# Patient Record
Sex: FEMALE | Race: WHITE | Employment: PART TIME | URBAN - METROPOLITAN AREA
[De-identification: names, ages, dates, MRNs, and addresses within clinical notes are randomized per-mention and may not be internally consistent; named-entity substitution may affect disease eponyms.]

---

## 2019-05-08 ENCOUNTER — HOSPITAL ENCOUNTER (EMERGENCY)
Age: 39
Discharge: HOME | End: 2019-05-08
Attending: EMERGENCY MEDICINE

## 2019-05-08 VITALS
TEMPERATURE: 98.6 F | HEIGHT: 71 IN | HEART RATE: 88 BPM | BODY MASS INDEX: 39.9 KG/M2 | SYSTOLIC BLOOD PRESSURE: 112 MMHG | OXYGEN SATURATION: 99 % | WEIGHT: 285 LBS | RESPIRATION RATE: 14 BRPM | DIASTOLIC BLOOD PRESSURE: 58 MMHG

## 2019-05-08 DIAGNOSIS — M54.42 ACUTE LEFT-SIDED LOW BACK PAIN WITH LEFT-SIDED SCIATICA: Primary | ICD-10-CM

## 2019-05-08 PROCEDURE — 96372 THER/PROPH/DIAG INJ SC/IM: CPT

## 2019-05-08 PROCEDURE — 99283 EMERGENCY DEPT VISIT LOW MDM: CPT

## 2019-05-08 PROCEDURE — 6360000002 HC RX W HCPCS: Performed by: PHYSICIAN ASSISTANT

## 2019-05-08 RX ORDER — KETOROLAC TROMETHAMINE 30 MG/ML
30 INJECTION, SOLUTION INTRAMUSCULAR; INTRAVENOUS ONCE
Status: COMPLETED | OUTPATIENT
Start: 2019-05-08 | End: 2019-05-08

## 2019-05-08 RX ORDER — TRAMADOL HYDROCHLORIDE 50 MG/1
50 TABLET ORAL EVERY 4 HOURS PRN
Qty: 15 TABLET | Refills: 0 | Status: SHIPPED | OUTPATIENT
Start: 2019-05-08 | End: 2019-05-11

## 2019-05-08 RX ORDER — DEXAMETHASONE SODIUM PHOSPHATE 10 MG/ML
8 INJECTION, SOLUTION INTRAMUSCULAR; INTRAVENOUS ONCE
Status: COMPLETED | OUTPATIENT
Start: 2019-05-08 | End: 2019-05-08

## 2019-05-08 RX ORDER — CYCLOBENZAPRINE HCL 10 MG
10 TABLET ORAL 3 TIMES DAILY PRN
Qty: 12 TABLET | Refills: 0 | Status: SHIPPED | OUTPATIENT
Start: 2019-05-08 | End: 2019-07-08 | Stop reason: ALTCHOICE

## 2019-05-08 RX ORDER — METHYLPREDNISOLONE 4 MG/1
TABLET ORAL
Qty: 1 KIT | Refills: 0 | Status: SHIPPED | OUTPATIENT
Start: 2019-05-08 | End: 2019-05-14

## 2019-05-08 RX ADMIN — KETOROLAC TROMETHAMINE 30 MG: 30 INJECTION, SOLUTION INTRAMUSCULAR at 11:26

## 2019-05-08 RX ADMIN — DEXAMETHASONE SODIUM PHOSPHATE 8 MG: 10 INJECTION, SOLUTION INTRAMUSCULAR; INTRAVENOUS at 11:26

## 2019-05-08 ASSESSMENT — PAIN SCALES - GENERAL
PAINLEVEL_OUTOF10: 10
PAINLEVEL_OUTOF10: 10

## 2019-05-08 NOTE — ED PROVIDER NOTES
[address]  Emergency Department  Independent Attestation     Pt Name: Piyush Skelton  MRN: [id number]  [place] 1980  Date of evaluation: 5/8/19       Piyush Skelton is a 34 y.o. female who presents with Back Pain (after moving) and Leg Pain      I was personally available for consultation in the Emergency Department.     Florina Rhodes DO  Attending Emergency Physician  [address]      (Please note that portions of this note were completed with a voice recognition program.  Efforts were made to edit the dictations but occasionally words are mis-transcribed.)        Florina Rhodes DO  05/08/19 110

## 2019-05-08 NOTE — ED PROVIDER NOTES
[address]  eMERGENCY dEPARTMENT eNCOUnter      Pt Name: Piyush Skelton  MRN: [id number]  [place] 1980  Date of evaluation: 5/8/2019  Provider: Qiana Springer Dr       Chief Complaint   Patient presents with    Back Pain     after moving    Leg Pain           HISTORY OF PRESENT ILLNESS  (Location/Symptom, Timing/Onset, Context/Setting, Quality, Duration, Modifying Factors, Severity.)   Piyush Skelton is a 34 y.o. female who presents to the emergency department with complaints of low back pain with radiation down the left leg. Pt states she was moving furniture yesterday when the pain began. Pt reports the pain is 10/10 across lower back with radiation down the left leg. Pain is sharp, burning. Pt denies fever, chills, cp, sob, nausea, vomiting, abd pain, loss of bowel or bladder control, saddle anesthesia, numbness, tingling. Pt is ambulatory. She has not taken anything for pain. She drove here. No other complaints. Patient has a history of chronic back pain. No loss of bowel or bladder control, no numbness or tingling  Patient denies any history of IV drug use, denies any fevers, chills, abdominal pain nausea or vomiting    Location/Symptom: low back  Quality: Aching  Duration: Persistent  Modifying Factors: Worse with bending and twisting  Severity: 10/10    Nursing Notes were reviewed. REVIEW OF SYSTEMS    (2-9 systems for level 4, 10 or more for level 5)     Review of Systems   Constitutional: Negative. Respiratory: Negative. Cardiovascular: Negative. Gastrointestinal: Negative. Musculoskeletal: Complaining of back pain  Genitourinary: Negative. Skin: Negative. Neurological: Negative. Except as noted above the remainder of the review of systems was reviewed and negative. PAST MEDICAL HISTORY   No past medical history on file. None otherwise stated in nurses notes    SURGICAL HISTORY     No past surgical history on file.   None otherwise stated in nurses notes    CURRENT MEDICATIONS       Previous Medications    No medications on file       ALLERGIES     Patient has no known allergies. FAMILY HISTORY     No family history on file. No family status information on file. None otherwise stated in nurses notes    SOCIAL HISTORY        Lives at home with others      PHYSICAL EXAM    (up to 7 for level 4, 8 or more for level 5)     ED Triage Vitals [05/08/19 1027]   BP Temp Temp src Pulse Resp SpO2 Height Weight   (!) 112/58 98.6 °F (37 °C) -- 88 14 99 % 5' 11\" (1.803 m) 285 lb (129.3 kg)       Physical Exam   Nursing note and vitals reviewed. Constitutional: Oriented to person, place, and time and well-developed, well-nourished  Head: Normocephalic and atraumatic. Ear: External ears normal.   Nose: Nose normal and midline. Eyes: Conjunctivae and EOM are normal. Pupils are equal, round, and reactive to light. Neck: Normal range of motion. Cardiovascular: Normal rate, regular rhythm, normal heart sounds and intact distal pulses. Pulmonary/Chest: Effort normal and breath sounds normal. No respiratory distress. No wheezes. No rales. No chest tenderness. Abdomen:  Soft, non-tender  Musculoskeletal: Normal range of motion. Mild paraspinal muscle tenderness over left and right lumbar spine, mild midline tenderness, pain over left buttock, no step-off deformity, no swelling, no rashes, pt able to stand on toes and heels. Pt ambulatory. Neurological: Alert and oriented to person, place, and time. GCS score is 15.  5/5 strength in bilateral lower extremities with sensation to light touch intact. 2/2 dp and pt pulses. Pt ambulatory. Skin: Skin is warm and dry. No rash noted. No erythema. No pallor. DIAGNOSTIC RESULTS   RADIOLOGY:   All plain film, CT, MRI, and formal ultrasound images (except ED bedside ultrasound) are read by the radiologist, see reports below, unless otherwise noted in MDM or here.   No orders to display           LABS:  Labs Reviewed - No data to display    All other labs were within normal range or not returned as of this dictation. EMERGENCY DEPARTMENT COURSE and DIFFERENTIAL DIAGNOSIS/MDM:   Vitals:    Vitals:    05/08/19 1027   BP: (!) 112/58   Pulse: 88   Resp: 14   Temp: 98.6 °F (37 °C)   SpO2: 99%   Weight: 285 lb (129.3 kg)   Height: 5' 11\" (1.803 m)       ED MEDICATIONS  Orders Placed This Encounter   Medications    dexamethasone (PF) (DECADRON) injection 8 mg    ketorolac (TORADOL) injection 30 mg         CONSULTS:  None    PROCEDURES:  None      Acute on chronic lower back pain. Began after lifting furniture. Pain is radiating down left leg. No neuro deficits or red flag symptoms. Suspect sciatica. Will treat with toradol and decadron. Will dc home with medication. Follow up with PCP. Discussed results and plan with the pt. They expressed appropriate understanding. Pt given close follow up, supportive care instructions and strict return instructions at the bedside. Patient instructed to return to the emergency room if symptoms worsen, return, or any other concern right away which is agreed. Follow up with PCP in 2-3 days for re-evaluation. The patient presents with low back pain without signs of spinal cord compression, cauda equina syndrome, infection, aneurysm or other serious etiology. The patient is neurologically intact. Given the extremely low risk of these diagnoses further testing and evaluation for these possibilities does not appear to be indicated at this time. The patient has been instructed to return if the symptoms worsen or change in any way.          FINAL IMPRESSION      1.  Acute left-sided low back pain with left-sided sciatica          DISPOSITION/PLAN   DISPOSITION Decision To Discharge    PATIENT REFERRED TO:  [facility]  [address]er [address]  [address] [address]  [phone]    If symptoms worsen    pcp  see clinic list          DISCHARGE MEDICATIONS:  New Prescriptions    No medications on file       (Please note that portions of this note were completed with a voice recognition program.  Efforts were made to edit the dictations but occasionally words are mis-transcribed.)    Maddison Mclaughlin, [address] Eduardo Brewer PA-C  05/08/19 1373

## 2019-07-08 ENCOUNTER — HOSPITAL ENCOUNTER (EMERGENCY)
Age: 39
Discharge: HOME | End: 2019-07-08
Attending: EMERGENCY MEDICINE

## 2019-07-08 VITALS
HEART RATE: 71 BPM | HEIGHT: 71 IN | SYSTOLIC BLOOD PRESSURE: 116 MMHG | TEMPERATURE: 98.6 F | DIASTOLIC BLOOD PRESSURE: 67 MMHG | OXYGEN SATURATION: 95 % | WEIGHT: 293 LBS | RESPIRATION RATE: 16 BRPM | BODY MASS INDEX: 41.02 KG/M2

## 2019-07-08 DIAGNOSIS — G56.03 BILATERAL CARPAL TUNNEL SYNDROME: Primary | ICD-10-CM

## 2019-07-08 PROCEDURE — 6360000002 HC RX W HCPCS: Performed by: PHYSICIAN ASSISTANT

## 2019-07-08 PROCEDURE — 96372 THER/PROPH/DIAG INJ SC/IM: CPT

## 2019-07-08 PROCEDURE — 99282 EMERGENCY DEPT VISIT SF MDM: CPT

## 2019-07-08 PROCEDURE — 6370000000 HC RX 637 (ALT 250 FOR IP): Performed by: PHYSICIAN ASSISTANT

## 2019-07-08 RX ORDER — KETOROLAC TROMETHAMINE 15 MG/ML
30 INJECTION, SOLUTION INTRAMUSCULAR; INTRAVENOUS ONCE
Status: COMPLETED | OUTPATIENT
Start: 2019-07-08 | End: 2019-07-08

## 2019-07-08 RX ORDER — PREDNISONE 50 MG/1
50 TABLET ORAL DAILY
Qty: 4 TABLET | Refills: 0 | Status: SHIPPED | OUTPATIENT
Start: 2019-07-08 | End: 2019-07-12

## 2019-07-08 RX ORDER — PREDNISONE 20 MG/1
60 TABLET ORAL ONCE
Status: COMPLETED | OUTPATIENT
Start: 2019-07-08 | End: 2019-07-08

## 2019-07-08 RX ADMIN — PREDNISONE 60 MG: 20 TABLET ORAL at 18:23

## 2019-07-08 RX ADMIN — KETOROLAC TROMETHAMINE 30 MG: 15 INJECTION, SOLUTION INTRAMUSCULAR; INTRAVENOUS at 18:23

## 2019-07-08 ASSESSMENT — PAIN DESCRIPTION - PAIN TYPE: TYPE: ACUTE PAIN

## 2019-07-08 ASSESSMENT — PAIN DESCRIPTION - ORIENTATION: ORIENTATION: LEFT;RIGHT

## 2019-07-08 ASSESSMENT — PAIN DESCRIPTION - LOCATION: LOCATION: WRIST;HAND

## 2019-07-08 ASSESSMENT — PAIN SCALES - GENERAL
PAINLEVEL_OUTOF10: 5
PAINLEVEL_OUTOF10: 5

## 2019-07-08 ASSESSMENT — PAIN DESCRIPTION - PROGRESSION: CLINICAL_PROGRESSION: NOT CHANGED

## 2019-07-08 NOTE — ED PROVIDER NOTES
Temp 98.6 °F (37 °C) (Oral)   Resp 16   Ht 5' 11\" (1.803 m)   Wt 133.8 kg (295 lb)   LMP 06/30/2019 (Exact Date)   SpO2 95%   BMI 41.14 kg/m²     Constitutional:  Well developed   Eyes:  Pupils equal/round  HENT:  Atraumatic, external ears normal, nose normal  Respiratory:  LCTA bilat, no W/R/R  Cardiovascular:  RRR with normal S1 and S2  Musculoskeletal:  Mild TTP of bilat wrist, full ROM of wrists, fingers and thumb opposition.  (+) Phalen's. No edema/trauma/rash. No other TTP of BUE. NV intact distally x10. Integument:   No rash. Neurologic:  Alert & appropriate mentation/interaction, no focal deficits noted     DIAGNOSTIC RESULTS     EKG: All EKG's are interpreted by the Emergency Department Physician who either signs or Co-signs this chart in the absence of a cardiologist.  Not indicated    RADIOLOGY:   Reviewed the radiologist:  No orders to display     Not indicated      LABS:  Labs Reviewed - No data to display  Not indicated    [facility]:     1819  CTS bilaterally, no trauma or need for imaging. Recent new job at [employer] using hands significantly. Toradol, Prednisone and wrist splint recommended. Discussed mindful rest and giving Ortho f/u as needed. I have reviewed the disposition diagnosis with the patient and or their family/guardian. I have answered their questions and given discharge instructions. They voiced understanding of these instructions and did not have any further questions or complaints. Orders Placed This Encounter   Medications    ketorolac (TORADOL) injection 30 mg    predniSONE (DELTASONE) tablet 60 mg    predniSONE (DELTASONE) 50 MG tablet     Sig: Take 1 tablet by mouth daily for 4 days     Dispense:  4 tablet     Refill:  0       CONSULTS:  None      FINAL IMPRESSION      1.  Bilateral carpal tunnel syndrome          DISPOSITION/PLAN:  DISPOSITION Decision To Discharge 07/08/2019 06:21:33 PM        PATIENT

## 2019-07-09 ENCOUNTER — TELEPHONE (OUTPATIENT)
Dept: ORTHOPEDIC SURGERY | Age: 39
End: 2019-07-09

## 2019-07-10 ENCOUNTER — HOSPITAL ENCOUNTER (OUTPATIENT)
Facility: CLINIC | Age: 39
Discharge: HOME | End: 2019-07-12
Payer: COMMERCIAL

## 2019-07-10 ENCOUNTER — HOSPITAL ENCOUNTER (OUTPATIENT)
Dept: GENERAL RADIOLOGY | Facility: CLINIC | Age: 39
Discharge: HOME | End: 2019-07-12
Payer: COMMERCIAL

## 2019-07-10 DIAGNOSIS — R52 PAIN: ICD-10-CM

## 2019-07-10 PROCEDURE — 73110 X-RAY EXAM OF WRIST: CPT

## 2019-08-07 ENCOUNTER — HOSPITAL ENCOUNTER (OUTPATIENT)
Dept: OCCUPATIONAL THERAPY | Age: 39
Setting detail: THERAPIES SERIES
Discharge: HOME | End: 2019-08-07
Payer: COMMERCIAL

## 2019-08-07 PROCEDURE — 97110 THERAPEUTIC EXERCISES: CPT

## 2019-08-07 PROCEDURE — 97166 OT EVAL MOD COMPLEX 45 MIN: CPT

## 2019-08-07 ASSESSMENT — PAIN SCALES - GENERAL: PAINLEVEL_OUTOF10: 6

## 2019-08-07 ASSESSMENT — 9 HOLE PEG TEST
TESTTIME_SECONDS: 23
TEST_RESULT: IMPAIRED
TESTTIME_SECONDS: 27
TEST_RESULT: IMPAIRED

## 2019-08-07 ASSESSMENT — PAIN DESCRIPTION - LOCATION: LOCATION: HAND;WRIST

## 2019-08-07 ASSESSMENT — PAIN DESCRIPTION - PAIN TYPE: TYPE: ACUTE PAIN

## 2019-08-07 ASSESSMENT — PAIN DESCRIPTION - ORIENTATION: ORIENTATION: RIGHT;LEFT

## 2019-08-07 NOTE — PROGRESS NOTES
[facility]  Rehabilitation Services   Occupational Therapy Evaluation  Date: 19  Patient Name: Michi Torrez      MRN: [id number]  Account: [de-identified]   : 1980  (37 y.o.)  Gender: female   Referring Practitioner: Dr Homa Silva. German Tabares MD  Diagnosis: ICD-10 codes S63.501A( unspecified sprain rt wrist), Y26.651C (strain muscle, fascia, & tendon at wrist &  rt hand) , S63.502A (unspecified sprain of lt wrist), W08.485O(MNAFXA  muscle,fascia & tendon at wrist & lt hand) M65. 4(radial styloid tenosynovitis (deQuervain)  OT Visit Information  Onset Date: 19  OT Insurance Information: Workers comp [employer] attn Adri Prescott 3x/ 3 weeks   Total # of Visits Approved: 9  Total # of Visits to Date: 1  Progress Note Counter: 19 MD appt  Past Medical History:  has no past medical history on file. Past Surgical History:   Hernia repair   Pain Assessment  Patient Currently in Pain: Yes  Pain Assessment: 0-10  Pain Level: 6  Pain Type: Acute pain  Pain Location: Hand, Wrist  Pain Orientation: Right, Left  Pain Descriptors: Constant, Numbness, Aching, Sharp  Patient's Stated Pain Goal: No pain  Subjective  Subjective: Pt off work. 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 Pt wears bilateral black wrist braces. Pt reports some relief of pain from wearing wrist braces. Pt alternates from ice and heat for her wrist/hands to relieve.   Comments: Pt seen for OT eval.   Vision  Vision: Impaired  Vision Exceptions: Wears glasses for reading  Hearing  Hearing: Within functional limits  Social/Functional History  Lives With: Spouse  Type of Home: Apartment  Home Layout: One level  Home Access: Stairs to enter with rails  Entrance Stairs - Number of Steps: 5 steps  Bathroom Shower/Tub: Tub/Shower unit  Bathroom Toilet: Standard  ADL Assistance: Independent(Pt had her hair cut d/t pain when using brush on her Radial Deviation: 3+/5  R Wrist Ulnar Deviation: 3+/5  R Hand General: (4- Extensors. 3+ Flexors)  RUE Strength Comment: Wrist pain   RUE Tone: Normotonic  RUE PROM (degrees)  RUE PROM: WFL  RUE AROM (degrees)  R Forearm Pron 0-90: wfls  R Forearm Supination  0-90: 75  R Wrist Flexion 0-80: 55- pain  R Wrist Extension 0-70: 60- pain   R Wrist Radial Deviation 0-20: 15-pain  R Wrist Ulnar Deviation 0-45: 25- pain   Right Hand AROM (degrees)  Right Hand General AROM: extension wfls. Flexion to [facility]: index 2cm , long 2cm, ring 2 cm ,  little finger 1 cm. Pt reports rt hand feels tight. Coordination  Movements Are Fluid And Coordinated: No  Coordination and Movement description: Fine motor impairments, Right UE, Left UE, Decreased speed   Left Hand Strength -  (lbs)  Handle Setting 2: 15, 15 below 10th percentile for age/sex norms  Left Hand Strength - Pinch (lbs)  Lateral: 6, 6 - pain, below 10th percentile for age/sex norms  Tip: 4, 4 - pain below 10th percentile for age/sex norms(Lt wrist pain worse than rt)  Palmar 3 point: 4, 5 - pain, below 10th percentile for age/sex norms  Right Hand Strength -  (lbs)  Handle Setting 2: 10, 10 below 10th percentile for age/sex norms  Right Hand Strength - Pinch (lbs)  Lateral: 6, 8 - average 7#  pain, below 10th percentile for age/sex norms  Tip: 5, 5 - pain,below 10th percentile for age/sex norms  Palmar 3 point: 5, 5 - pain below 10th percentile for age/sex norms  Fine Motor Skills  Left 9-Hole Peg Test: Impaired(25th percentile for age/sex norms)  Left 9 Hole Peg Test Time (secs): 23  Right 9-Hole Peg Test: Impaired(below 10th percentile for age/sex norms)  Right 9 Hole Peg Test Time (secs): 27  Other exercises  Other exercises?: Yes  Other exercises 1: HEP instruction for bilateral hands: AROM exercises 6 Pack. Pt correctly demo exercises and handout issued to pt.  OT told pt to use ice or heat on her wrist /hands at home for soreness from eval. OT issued stockinette strength (forearm, wrist(extensors, RD, UD),hand(extensors) to 4/5 to make light lift/carry within her wt restriction easiser to do  Long term goal 3: Using the OPTIMAL Instrument pt will score 1-2 (little to no difficulty w bilateral hand grasping), score 1(no difficulty w bilateral hand pushing), scores 2 (little difficulty w pulling)  Long term goal 4: Pt will report decrease pain to 2 when completing daily activities and exercises. 08/07/19 4763   OT Education   OT Education OT Role;Plan of Care;Home Exercise Program   Patient Education HEP instruction for bilateral hands: AROM exercises 6 Pack. Pt correctly demo exercises and handout issued to pt. OT told pt to use ice or heat on her wrist /hands at home for soreness from eval. OT issued stockinette for pt to wear under wrist braces   Barriers to Learning none     Plan  REQUIRES OT FOLLOW UP: Yes  Plan  Times per week: 3x/week  Plan weeks: 3 weeks  (9 visits)  Current Treatment Recommendations: Strengthening, ROM, Patient/Caregiver Education & Training, Modalities (comment)(coordination, ultrasound)  Plan Comment: continue OT  OT Individual Minutes  Time In: 2739  Time Out: 7528  Minutes: 51  Time Code Minutes   Timed Code Treatment Minutes: 20 Minutes  Rehab Potential:  [x] Good  [] Alyssa Luo  [] Poor   Suggested Professional Referral:  [x] No  [] Yes:  Barriers to Goal Achievement:  [x] No  [] Yes: Domestic Concerns:  [x] No  [] Yes:  Treatment Plan:  [x] Therapeutic Exercise    [] Modalities:  [x] Ultrasound   [x] Instruction in HEP       Frequency:      3     X/wk x     3     Wk's (9 visits)    [x] Plans/Goals, Risk/Benefits discussed with pt  Comprehension of Education [x] D/V Understanding  [] Needs Review  Pt Education: [x] Verbal  [x] Demo  [x] Written    Regulatory Requirements:   I have reviewed this plan of care and certify a need for Medically necessary rehabilitation services.         [x] Physician Signature                                      Date:

## 2019-08-08 ENCOUNTER — HOSPITAL ENCOUNTER (OUTPATIENT)
Dept: OCCUPATIONAL THERAPY | Age: 39
Setting detail: THERAPIES SERIES
Discharge: HOME | End: 2019-08-08
Payer: COMMERCIAL

## 2019-08-08 PROCEDURE — 97110 THERAPEUTIC EXERCISES: CPT

## 2019-08-08 PROCEDURE — 97035 APP MDLTY 1+ULTRASOUND EA 15: CPT

## 2019-08-08 ASSESSMENT — PAIN DESCRIPTION - PAIN TYPE: TYPE: ACUTE PAIN

## 2019-08-08 ASSESSMENT — PAIN DESCRIPTION - FREQUENCY: FREQUENCY: CONTINUOUS

## 2019-08-08 ASSESSMENT — PAIN DESCRIPTION - LOCATION: LOCATION: HAND;WRIST

## 2019-08-08 ASSESSMENT — PAIN DESCRIPTION - ORIENTATION: ORIENTATION: LEFT;RIGHT

## 2019-08-08 ASSESSMENT — PAIN SCALES - GENERAL: PAINLEVEL_OUTOF10: 6

## 2019-08-14 ENCOUNTER — HOSPITAL ENCOUNTER (OUTPATIENT)
Dept: OCCUPATIONAL THERAPY | Age: 39
Setting detail: THERAPIES SERIES
Discharge: HOME | End: 2019-08-14
Payer: COMMERCIAL

## 2019-08-14 PROCEDURE — 97110 THERAPEUTIC EXERCISES: CPT

## 2019-08-14 PROCEDURE — 97035 APP MDLTY 1+ULTRASOUND EA 15: CPT

## 2019-08-14 ASSESSMENT — PAIN DESCRIPTION - LOCATION: LOCATION: WRIST;HAND

## 2019-08-14 ASSESSMENT — PAIN DESCRIPTION - FREQUENCY: FREQUENCY: CONTINUOUS

## 2019-08-14 ASSESSMENT — PAIN DESCRIPTION - ORIENTATION: ORIENTATION: RIGHT;LEFT

## 2019-08-14 ASSESSMENT — PAIN SCALES - GENERAL: PAINLEVEL_OUTOF10: 7

## 2019-08-14 ASSESSMENT — PAIN DESCRIPTION - PAIN TYPE: TYPE: ACUTE PAIN

## 2019-08-14 NOTE — PROGRESS NOTES
Occupational [address]   Rehabilitation Services  Occupational Therapy Treatment Note  Date: 19  Patient Name: Stalin Queen    MRN: [id number]  Account: [de-identified]   : 1980  (37 y.o.) Gender: female     General  Referring Practitioner: Dr Frederic Moore. Andre Bush MD  Diagnosis: ICD-10 codes S63.501A( unspecified sprain rt wrist), L24.846B (strain muscle, fascia, & tendon at wrist &  rt hand) , S63.502A (unspecified sprain of lt wrist), C84.364Y(RXFUCZ  muscle,fascia & tendon at wrist & lt hand) M65. 4(radial styloid tenosynovitis (deQuervain)  OT Visit Information  OT Insurance Information: Workers comp [employer] attn Shahid Koch 3x/ 3 weeks   Total # of Visits Approved: 9  Total # of Visits to Date: 3  Progress Note Counter: 19 MD appt  Subjective  Subjective: Pt states her pain in bilateral hands /wrist is up  and she has been wearing her braces and icing helps some. Pt reports when doing claw hand ex w rt hand for HEP she has increase pain.    Pain Assessment  Patient Currently in Pain: Yes  Pain Assessment: 0-10  Pain Level: 7  Pain Type: Acute pain  Pain Location: Wrist, Hand  Pain Orientation: Right, Left  Pain Descriptors: Constant, Aching, Sore, Numbness  Pain Frequency: Continuous  Patient's Stated Pain Goal: No pain        Wrist/Hand Exercises  Wrist/Hand Therapy?: Yes  Pre Pronation/Supination Reps/Sets/Weight: 1# 10x each for rt & lt   Pre Wrist Flexion Reps/Sets/Weight: 1# 10x each for rt and lt  flex/ext  w palm up over ramp  Pre Wrist Ext Reps/Sets/Weight: 1# 10x each for rt & lt  flex/ext w palm down over ramp  Pre Radial Deviation Reps/Sets/Weight: 1# 10x each for rt  & lt RD/UD over ramp  Other exercises  Other exercises 2: PROM and massage bilateral wrists and hands  Other exercises 3: ultrasound 1.0 w/cm2 (5 minutes voloar rt hand/wrist/ forearm), ultrasound 1.0 w/cm2 (5 minutes volar side lt hand/wrist,forearm)  Other exercises 4:

## 2019-08-15 ENCOUNTER — HOSPITAL ENCOUNTER (OUTPATIENT)
Dept: OCCUPATIONAL THERAPY | Age: 39
Setting detail: THERAPIES SERIES
Discharge: HOME | End: 2019-08-15
Payer: COMMERCIAL

## 2019-08-15 PROCEDURE — 97110 THERAPEUTIC EXERCISES: CPT

## 2019-08-15 PROCEDURE — 97035 APP MDLTY 1+ULTRASOUND EA 15: CPT

## 2019-08-15 ASSESSMENT — PAIN DESCRIPTION - LOCATION: LOCATION: WRIST;HAND

## 2019-08-15 ASSESSMENT — PAIN DESCRIPTION - ORIENTATION: ORIENTATION: LEFT;RIGHT

## 2019-08-15 ASSESSMENT — PAIN SCALES - GENERAL: PAINLEVEL_OUTOF10: 7

## 2019-08-15 ASSESSMENT — PAIN DESCRIPTION - PAIN TYPE: TYPE: ACUTE PAIN

## 2019-08-15 ASSESSMENT — PAIN DESCRIPTION - FREQUENCY: FREQUENCY: CONTINUOUS

## 2019-08-16 ENCOUNTER — HOSPITAL ENCOUNTER (OUTPATIENT)
Dept: OCCUPATIONAL THERAPY | Age: 39
Discharge: HOME | End: 2019-08-16

## 2019-08-19 ENCOUNTER — HOSPITAL ENCOUNTER (OUTPATIENT)
Dept: OCCUPATIONAL THERAPY | Age: 39
Setting detail: THERAPIES SERIES
Discharge: HOME | End: 2019-08-19
Payer: COMMERCIAL

## 2019-08-19 PROCEDURE — 97110 THERAPEUTIC EXERCISES: CPT

## 2019-08-19 PROCEDURE — 97035 APP MDLTY 1+ULTRASOUND EA 15: CPT

## 2019-08-19 ASSESSMENT — PAIN DESCRIPTION - PROGRESSION: CLINICAL_PROGRESSION: NOT CHANGED

## 2019-08-19 ASSESSMENT — PAIN SCALES - GENERAL: PAINLEVEL_OUTOF10: 7

## 2019-08-19 ASSESSMENT — PAIN DESCRIPTION - ORIENTATION: ORIENTATION: LEFT;RIGHT

## 2019-08-19 ASSESSMENT — 9 HOLE PEG TEST
TESTTIME_SECONDS: 21
TESTTIME_SECONDS: 22

## 2019-08-19 ASSESSMENT — PAIN DESCRIPTION - LOCATION: LOCATION: WRIST;HAND

## 2019-08-19 ASSESSMENT — PAIN DESCRIPTION - PAIN TYPE: TYPE: ACUTE PAIN

## 2019-08-19 ASSESSMENT — PAIN DESCRIPTION - FREQUENCY: FREQUENCY: CONTINUOUS

## 2019-08-19 NOTE — PROGRESS NOTES
Decreased sensation  Assessment: Pt continues w bilateral hand/wrist pain. Pt making progress w bilateral hand dexterity, bilateral hand strength, weakness bilateral wrist strength. See OT exercises for details. Ultrasound to decrease bilateral hand/wrist pain. OT instructed pt to do ice bilateral hand/wrist when she goes home. Treatment Diagnosis: bilateral wrist/hand weakness & pain, bilateral hand impaired coordination & sensation  Prognosis: Good  REQUIRES OT FOLLOW UP: Yes  Discharge Recommendations: Outpatient OT   Goals  Patient Goals   Patient goals : To have no pain . To sleep comfortable. To RTW. Goal ongoing. Short term goals  Short term goal 1: Pt will demonstrate independence w bilateral wrist/hand HEP. Goal met  Short term goal 2: Pt will demonstrate  &/or verbalize improved bilateral hand dexterity & speed w daily activities. Pt will complete 9 hole peg test 2 seconds faster w lt hand, & 8 seconds faster w rt hand. Goal met lt hand. Pt making progress w rt hand, so goal ongoing. Short term goal 3: Increase bilateral  strength 15-20# for holding and stabilizing objects. Pt making progress w bilateral hands. Goal partly met rt hand. Goal ongoing bilateral hands. Short term goal 4: Increase bilateral pinch strength (lateral by 4#, tip and 3jaw by 5#) to improve grasp ability. Pt making progress w all pinch strengths. Goal met lt lateral pinch & rt 3jaw pinch. Goal ongoing for other pinches. Short term goal 5: Increase bilateral wrist AROM : flexion by 10, UD by 5-10, & pt will make composite tight fist bilateral hands to hold items tighter. Pt making progress w bilateral hand flexion. Goal met lt wrist flexion & rt wrist UD. Goal ongoing rt wrist flexion & ;t wrist UD  Long term goals  Long term goal 1: Increase rt UE strength (forearm, wrist (flexors, extensors, RD,UD), hand (flexors, extensors)) to 4/5 to make light lift /carry within her wt restriction easier to do. Goal ongoing  Long term goal 2: Increase lt UE strength (forearm, wrist(extensors, RD, UD),hand(extensors) to 4/5 to make light lift/carry within her wt restriction easiser to do. Goal ongoing for lt wrist except lt wrist extensors. Long term goal 3: Using the OPTIMAL Instrument pt will score 1-2 (little to no difficulty w bilateral hand grasping), score 1(no difficulty w bilateral hand pushing), scores 2 (little difficulty w pulling). Goal ongoing. Long term goal 4: Pt will report decrease pain to 2 when completing daily activities and exercises. Goal ongoing. Plan  REQUIRES OT FOLLOW UP: Yes  Plan  Times per week: 3x/week  Plan weeks: 3 weeks  (9 visits)  Current Treatment Recommendations: Strengthening, ROM, Patient/Caregiver Education & Training, Modalities (comment)(coordination, ultrasound)  Plan Comment: continue OT. Send re-eval to MD  OT Individual Minutes  Time In: 3855  Time Out: 1802  Minutes: 55  Time Code Minutes   Timed Code Treatment Minutes: 40 Minutes  Rehab Potential:  [x] Good  [] Jaki Rose  [] Poor   Suggested Professional Referral:  [x] No  [] Yes:  Barriers to Goal Achievement:  [x] No  [] Yes: Domestic Concerns:  [x] No  [] Yes:  Treatment Plan:  [x] Therapeutic Exercise      [x] Instruction in HEP       Frequency:     3      X/wk x      3    Wk's (pt has 4 visits left on current C-()    [x] Plans/Goals, Risk/Benefits discussed with pt  Comprehension of Education [x] D/V Understanding  [] Needs Review  Pt Education: [x] Verbal  [] Demo  [] Written    Regulatory Requirements:   I have reviewed this plan of care and certify a need for Medically necessary rehabilitation services.         [] Physician Signature                                      Date:   [address] [facility]  [address] [facility], [address] [place] [address] [address]   [address], [address]  Phone: [phone]  Fax: [phone]  Electronically signed by Sadi Campos OT on 8/19/19 at 6:50 PM    Treatment Charges:  Minutes Units

## 2019-08-21 ENCOUNTER — HOSPITAL ENCOUNTER (OUTPATIENT)
Dept: OCCUPATIONAL THERAPY | Age: 39
Setting detail: THERAPIES SERIES
Discharge: HOME | End: 2019-08-21
Payer: COMMERCIAL

## 2019-08-21 PROCEDURE — 97110 THERAPEUTIC EXERCISES: CPT

## 2019-08-21 PROCEDURE — 97035 APP MDLTY 1+ULTRASOUND EA 15: CPT

## 2019-08-21 ASSESSMENT — PAIN DESCRIPTION - PROGRESSION: CLINICAL_PROGRESSION: NOT CHANGED

## 2019-08-21 ASSESSMENT — PAIN DESCRIPTION - ORIENTATION: ORIENTATION: LEFT;RIGHT

## 2019-08-21 ASSESSMENT — PAIN DESCRIPTION - LOCATION: LOCATION: WRIST;HAND

## 2019-08-21 ASSESSMENT — PAIN DESCRIPTION - FREQUENCY: FREQUENCY: CONTINUOUS

## 2019-08-21 ASSESSMENT — PAIN SCALES - GENERAL: PAINLEVEL_OUTOF10: 7

## 2019-08-21 ASSESSMENT — PAIN DESCRIPTION - PAIN TYPE: TYPE: ACUTE PAIN

## 2019-08-21 NOTE — PROGRESS NOTES
Occupational [address]   Rehabilitation Services  Occupational Therapy Treatment Note  Date: 19  Patient Name: Sonia Canavan    MRN: [id number]  Account: [de-identified]   : 1980  (37 y.o.) Gender: female     General  Referring Practitioner: Dr Cate Santizo. Lopez Ferro MD  Diagnosis: ICD-10 codes S63.501A( unspecified sprain rt wrist), K47.184A (strain muscle, fascia, & tendon at wrist &  rt hand) , S63.502A (unspecified sprain of lt wrist), Y39.291V(RUASUE  muscle,fascia & tendon at wrist & lt hand) M65. 4(radial styloid tenosynovitis (deQuervain)  OT Visit Information  OT Insurance Information: Workers comp [employer] att[employer] 3x/ 3 weeks   Total # of Visits Approved: 9  Total # of Visits to Date: 6  Progress Note Counter: 19 MD appt  Subjective  Subjective: Pt states her rt long finger still locks up and she has cramping in her hands. Pain Assessment  Patient Currently in Pain: Yes  Pain Assessment: 0-10  Pain Level: 7  Pain Type: Acute pain  Pain Location: Wrist, Hand  Pain Orientation: Left, Right  Pain Descriptors: Aching, Cramping, Constant, Numbness  Pain Frequency: Continuous  Clinical Progression: Not changed  Patient's Stated Pain Goal: No pain        Wrist/Hand Exercises  Pre Pronation/Supination Reps/Sets/Weight: 1# 15x each for rt & lt   Pre Wrist Flexion Reps/Sets/Weight: 1# 15x each for rt and lt  flex/ext  w palm up over ramp  Pre Wrist Ext Reps/Sets/Weight: 1# 15x each for rt & lt  flex/ext w palm down over ramp  Pre Radial Deviation Reps/Sets/Weight: 1# 15x each for rt  & lt RD/UD over ramp  Other exercises  Other exercises 2: PROM and massage bilateral wrists and hands.  bilateral hand finger blocking exercises: 25x each PIP flex/ext, 25x each DIP flex/ext  Other exercises 3: ultrasound 1.0 w/cm2 (6 minutes voloar rt hand/wrist/ forearm), ultrasound 1.0 w/cm2 (6 minutes volar side lt hand/wrist,forearm)  Other exercises 4: sudhir

## 2019-08-23 ENCOUNTER — HOSPITAL ENCOUNTER (OUTPATIENT)
Dept: OCCUPATIONAL THERAPY | Age: 39
Setting detail: THERAPIES SERIES
Discharge: HOME | End: 2019-08-23
Payer: COMMERCIAL

## 2019-08-23 PROCEDURE — 97035 APP MDLTY 1+ULTRASOUND EA 15: CPT

## 2019-08-23 PROCEDURE — 97110 THERAPEUTIC EXERCISES: CPT

## 2019-08-23 ASSESSMENT — PAIN DESCRIPTION - ORIENTATION: ORIENTATION: LEFT;RIGHT

## 2019-08-23 ASSESSMENT — PAIN DESCRIPTION - PAIN TYPE: TYPE: ACUTE PAIN

## 2019-08-23 ASSESSMENT — PAIN SCALES - GENERAL: PAINLEVEL_OUTOF10: 7

## 2019-08-23 ASSESSMENT — PAIN DESCRIPTION - LOCATION: LOCATION: WRIST;HAND

## 2019-08-23 ASSESSMENT — PAIN DESCRIPTION - FREQUENCY: FREQUENCY: CONTINUOUS

## 2019-08-23 ASSESSMENT — PAIN DESCRIPTION - PROGRESSION: CLINICAL_PROGRESSION: NOT CHANGED

## 2019-08-23 NOTE — PROGRESS NOTES
red, 3 green place/remove w lt hand  Other exercises 6: juxaciser over and back :2 sets w rt hand, 2 sets w lt hand  Other exercises 7: red 3# digiflex - gripping (rt 25x, lt 25x), red 3# digiflex  25x (each  finger oppositional pinch rt/lt hands)  Other exercises 8: press down cone in putty: rt 15x, lt 15x  Assessment  Performance deficits / Impairments: Decreased ROM, Decreased strength, Decreased fine motor control, Decreased sensation  Assessment: OT tx focus on ultrasound, bilateral hand/wrist ROM, stretching, and strengthening exercises. Pt has cramping rt hand after clothespin exercise. See exercises for details.    Treatment Diagnosis: bilateral wrist/hand weakness & pain, bilateral hand impaired coordination & sensation  Prognosis: Good  REQUIRES OT FOLLOW UP: Yes  Discharge Recommendations: Outpatient OT           Plan  REQUIRES OT FOLLOW UP: Yes  Plan  Times per week: 3x/week  Plan weeks: 3 weeks  (9 visits)  Current Treatment Recommendations: Strengthening, ROM, Patient/Caregiver Education & Training, Modalities (comment)  Plan Comment: continue OT   OT Individual Minutes  Time In: 5204  Time Out: 0920  Minutes: 48  Time Code Minutes   Timed Code Treatment Minutes: 48 Minutes    Electronically signed by Kwasi Spangler OT on 8/23/19 at 9:24 AM          Treatment Charges:  Minutes Units   Ultrasound 12 1   Electrical Stim     Iontophoresis     Paraffin      Massage     Eval     ADL      Ther Exercise 36 2   Ther Activities     Neuro Re-Ed     Splinting      Other     Total Treatment Time:  48

## 2019-08-28 ENCOUNTER — HOSPITAL ENCOUNTER (OUTPATIENT)
Dept: OCCUPATIONAL THERAPY | Age: 39
Setting detail: THERAPIES SERIES
Discharge: HOME | End: 2019-08-28
Payer: COMMERCIAL

## 2019-08-28 PROCEDURE — 97035 APP MDLTY 1+ULTRASOUND EA 15: CPT

## 2019-08-28 PROCEDURE — 97110 THERAPEUTIC EXERCISES: CPT

## 2019-08-28 ASSESSMENT — PAIN DESCRIPTION - PROGRESSION: CLINICAL_PROGRESSION: NOT CHANGED

## 2019-08-28 ASSESSMENT — PAIN DESCRIPTION - ORIENTATION: ORIENTATION: LEFT;RIGHT

## 2019-08-28 ASSESSMENT — PAIN DESCRIPTION - LOCATION: LOCATION: WRIST;HAND

## 2019-08-28 ASSESSMENT — PAIN DESCRIPTION - FREQUENCY: FREQUENCY: CONTINUOUS

## 2019-08-28 ASSESSMENT — PAIN DESCRIPTION - PAIN TYPE: TYPE: ACUTE PAIN

## 2019-08-28 ASSESSMENT — PAIN SCALES - GENERAL: PAINLEVEL_OUTOF10: 7

## 2019-08-28 NOTE — PROGRESS NOTES
hand/wrist,forearm)  Other exercises 4: pyloball bilateral wrist stretch 10x each way (flex/ext, RD/UD, circles cw & ccw) : rt hand, 10x each flex/ext) & RD/UD and circles cw/ccw 5x each lt hand  Other exercises 5: graded clothespins ( 6 yellow ,6 red ,3 green -light to moderate resistance) - place/remove w rt hand, 6 yellow, 6 red, 3 green place/remove w lt hand  Other exercises 7: red 3# digiflex - gripping (rt 25x, lt 25x), red 3# digiflex  25x (each  finger oppositional pinch rt/lt hands)  Other exercises 8: press down cone in putty: rt 15x, lt 15x  Assessment  Performance deficits / Impairments: Decreased ROM, Decreased strength, Decreased fine motor control, Decreased sensation  Assessment: Pt has cramping rt long finger during graded clothespin exercise to increase hand strength and clicking rt long finger during finger blocking exercises. Pt reports increase lt wrist pain during pyloball stretch lt wrist (RD/UD, circles). Ultrasound, massage, & PROM to decrease bilateral hand/wrist pain & tightness. Pt completes bilateral wrist rom,stretching & bilateral hand/wrist strengthening exercises. OT told pt to stop pyloball exercise when lt wrist pain was increasing. See OT exercises for details.     Treatment Diagnosis: bilateral wrist/hand weakness & pain, bilateral hand impaired coordination & sensation  Prognosis: Good  REQUIRES OT FOLLOW UP: Yes  Discharge Recommendations: Outpatient OT           Plan  REQUIRES OT FOLLOW UP: Yes  Plan  Times per week: 3x/week  Plan weeks: 3 weeks  (9 visits)  Current Treatment Recommendations: Strengthening, ROM, Patient/Caregiver Education & Training, Modalities (comment)  Plan Comment: continue OT (1 more visit left on current C-9)  OT Individual Minutes  Time In: 0802  Time Out: 7278  Minutes: 48  Time Code Minutes   Timed Code Treatment Minutes: 48 Minutes    Electronically signed by Shawna Blanco OT on 8/28/19 at 9:40 AM          Treatment Charges:  Minutes Units Ultrasound 12 1   Electrical Stim     Iontophoresis     Paraffin      Massage     Eval     ADL      Ther Exercise 36 2   Ther Activities     Neuro Re-Ed     Splinting      Other     Total Treatment Time:  48

## 2019-08-29 ENCOUNTER — HOSPITAL ENCOUNTER (OUTPATIENT)
Dept: OCCUPATIONAL THERAPY | Age: 39
Setting detail: THERAPIES SERIES
Discharge: HOME | End: 2019-08-29
Payer: COMMERCIAL

## 2019-08-29 PROCEDURE — 97110 THERAPEUTIC EXERCISES: CPT

## 2019-08-29 PROCEDURE — 97035 APP MDLTY 1+ULTRASOUND EA 15: CPT

## 2019-08-29 ASSESSMENT — PAIN DESCRIPTION - PROGRESSION: CLINICAL_PROGRESSION: GRADUALLY WORSENING

## 2019-08-29 ASSESSMENT — PAIN DESCRIPTION - ORIENTATION: ORIENTATION: LEFT;RIGHT

## 2019-08-29 ASSESSMENT — PAIN DESCRIPTION - FREQUENCY: FREQUENCY: CONTINUOUS

## 2019-08-29 ASSESSMENT — PAIN SCALES - GENERAL: PAINLEVEL_OUTOF10: 8

## 2019-08-29 ASSESSMENT — PAIN DESCRIPTION - LOCATION: LOCATION: WRIST;HAND

## 2019-08-29 NOTE — PROGRESS NOTES
(6 minutes volar side lt hand/wrist,forearm)  Other exercises 4: pyloball bilateral wrist stretch 15x each way (flex/ext, RD/UD, circles cw & ccw) : rt hand, 10x each flex/ext) & RD/UD and circles cw/ccw 5x each lt hand  Other exercises 5: graded clothespins ( 6 yellow ,6 red ,3 green -light to moderate resistance) - place/remove w rt hand, 6 yellow, 6 red, 3 green place/remove w lt hand  Other exercises 6: juxaciser over and back :2 sets w rt hand, 2 sets w lt hand  Other exercises 7: red 3# digiflex - gripping (rt 25x, lt 25x), red 3# digiflex  25x (each  finger oppositional pinch rt/lt hands)  Assessment  Performance deficits / Impairments: Decreased ROM, Decreased strength, Decreased fine motor control, Decreased sensation  Assessment: Pt has pain bilateral wrist/hands today. Ultrasound to decrease pain and swelling. Tx focus on therapeutic exercises to improve bilateral wrist/lhand strength and coordination. Begun BAPS exercises for wrist RD/UD to improve mobility and strength. See OT exercises for details. Treatment Diagnosis: bilateral wrist/hand weakness & pain, bilateral hand impaired coordination & sensation  Prognosis: Good  REQUIRES OT FOLLOW UP: Yes  Discharge Recommendations: Outpatient OT           Plan  REQUIRES OT FOLLOW UP: Yes  Plan  Current Treatment Recommendations: Strengthening, ROM, Patient/Caregiver Education & Training, Modalities (comment)(coordination)  Plan Comment: Pt has completed her 9 approved OT visits. OT told pt that if therapy finds out that she is approved more OT then we will contact her.    OT Individual Minutes  Time In: 1116  Time Out: 4924  Minutes: 57  Time Code Minutes   Timed Code Treatment Minutes: 62 Minutes    Electronically signed by Nigel Meza OT on 8/29/19 at 6:30 PM          Treatment Charges:  Minutes Units   Ultrasound 12 1   Electrical Stim     Iontophoresis     Paraffin      Massage     Eval     ADL      Ther Exercise 45 3   Ther Activities     Neuro Re-Ed Splinting      Other     Total Treatment Time:  62

## 2020-02-06 ENCOUNTER — HOSPITAL ENCOUNTER (OUTPATIENT)
Dept: PREADMISSION TESTING | Age: 40
Discharge: HOME | End: 2020-02-10
Payer: MEDICAID

## 2020-02-06 VITALS
RESPIRATION RATE: 22 BRPM | HEIGHT: 71 IN | OXYGEN SATURATION: 94 % | WEIGHT: 272 LBS | HEART RATE: 90 BPM | TEMPERATURE: 98.6 F | BODY MASS INDEX: 38.08 KG/M2 | DIASTOLIC BLOOD PRESSURE: 80 MMHG | SYSTOLIC BLOOD PRESSURE: 137 MMHG

## 2020-02-06 LAB
ANION GAP SERPL CALCULATED.3IONS-SCNC: 15 MMOL/L (ref 9–17)
BUN BLDV-MCNC: 6 MG/DL (ref 6–20)
CHLORIDE BLD-SCNC: 103 MMOL/L (ref 98–107)
CO2: 22 MMOL/L (ref 20–31)
CREAT SERPL-MCNC: 0.65 MG/DL (ref 0.5–0.9)
GFR AFRICAN AMERICAN: >60 ML/MIN
GFR NON-AFRICAN AMERICAN: >60 ML/MIN
GFR SERPL CREATININE-BSD FRML MDRD: NORMAL ML/MIN/{1.73_M2}
GFR SERPL CREATININE-BSD FRML MDRD: NORMAL ML/MIN/{1.73_M2}
GLUCOSE BLD-MCNC: 122 MG/DL (ref 70–99)
HCT VFR BLD CALC: 41.1 % (ref 36.3–47.1)
HEMOGLOBIN: 13.7 G/DL (ref 11.9–15.1)
POTASSIUM SERPL-SCNC: 4.3 MMOL/L (ref 3.7–5.3)
SODIUM BLD-SCNC: 140 MMOL/L (ref 135–144)

## 2020-02-06 PROCEDURE — 85014 HEMATOCRIT: CPT

## 2020-02-06 PROCEDURE — 80051 ELECTROLYTE PANEL: CPT

## 2020-02-06 PROCEDURE — 82565 ASSAY OF CREATININE: CPT

## 2020-02-06 PROCEDURE — 84520 ASSAY OF UREA NITROGEN: CPT

## 2020-02-06 PROCEDURE — 85018 HEMOGLOBIN: CPT

## 2020-02-06 PROCEDURE — 82947 ASSAY GLUCOSE BLOOD QUANT: CPT

## 2020-02-06 PROCEDURE — 36415 COLL VENOUS BLD VENIPUNCTURE: CPT

## 2020-02-06 RX ORDER — SODIUM CHLORIDE, SODIUM LACTATE, POTASSIUM CHLORIDE, CALCIUM CHLORIDE 600; 310; 30; 20 MG/100ML; MG/100ML; MG/100ML; MG/100ML
1000 INJECTION, SOLUTION INTRAVENOUS CONTINUOUS
Status: CANCELLED | OUTPATIENT
Start: 2020-02-06

## 2020-02-06 RX ORDER — FAMOTIDINE 20 MG/1
20 TABLET, FILM COATED ORAL 2 TIMES DAILY
COMMUNITY
End: 2021-02-04

## 2020-02-06 NOTE — PROGRESS NOTES
Anesthesia Focused Assessment    STOP-BANG Sleep Apnea Questionnaire    SNORE loudly (heard through closed doors)? No  TIRED, fatigued, sleepy during daytime? No  OBSERVED stopping breathing during sleep? No  High blood PRESSURE being treated? No    BMI over 35? Yes  AGE over 48? No  NECK circumference over 16\"? No  GENDER (male)? No             Total 1  High risk 5-8  Intermediate risk 3-4  Low risk 0-2    Obstructive Sleep Apnea: denies  If YES, machine used: no     Type 1 DM:   no  T2DM:  no    Coronary Artery Disease:  no  Hypertension:  no    Active smoker:  1/2 ppd for 23 years. Drinks Alcohol:  monthly    Dentition: upper and lower full dentures    Defib / AICD / Pacemaker: no      Renal Failure/dialysis:  no    Patient was evaluated in PAT & anesthesia guidelines were applied. NPO guidelines, medication instructions and scheduled arrival time were reviewed with patient.     Hx of anesthesia complications:  no  Family hx of anesthesia complications:  no                                                                                                                     Anesthesia contacted:   no  Medical or cardiac clearance ordered: no    SANIA MCDOWELL PA-C  2/6/20  9:35 AM

## 2020-02-10 ENCOUNTER — ANESTHESIA (OUTPATIENT)
Dept: OPERATING ROOM | Age: 40
End: 2020-02-10
Payer: MEDICAID

## 2020-02-10 ENCOUNTER — ANESTHESIA EVENT (OUTPATIENT)
Dept: OPERATING ROOM | Age: 40
End: 2020-02-10
Payer: MEDICAID

## 2020-02-10 ENCOUNTER — HOSPITAL ENCOUNTER (OUTPATIENT)
Age: 40
Setting detail: OUTPATIENT SURGERY
Discharge: HOME | End: 2020-02-10
Attending: SURGERY | Admitting: SURGERY
Payer: MEDICAID

## 2020-02-10 VITALS
WEIGHT: 272 LBS | HEIGHT: 71 IN | RESPIRATION RATE: 18 BRPM | HEART RATE: 89 BPM | OXYGEN SATURATION: 98 % | BODY MASS INDEX: 38.08 KG/M2 | DIASTOLIC BLOOD PRESSURE: 68 MMHG | SYSTOLIC BLOOD PRESSURE: 124 MMHG | TEMPERATURE: 97.2 F

## 2020-02-10 VITALS
OXYGEN SATURATION: 100 % | SYSTOLIC BLOOD PRESSURE: 132 MMHG | TEMPERATURE: 97.1 F | DIASTOLIC BLOOD PRESSURE: 81 MMHG | RESPIRATION RATE: 11 BRPM

## 2020-02-10 PROBLEM — K80.10 CHOLELITHIASIS AND CHOLECYSTITIS WITHOUT OBSTRUCTION: Chronic | Status: ACTIVE | Noted: 2020-02-10

## 2020-02-10 PROCEDURE — 7100000040 HC SPAR PHASE II RECOVERY - FIRST 15 MIN: Performed by: SURGERY

## 2020-02-10 PROCEDURE — 7100000001 HC PACU RECOVERY - ADDTL 15 MIN: Performed by: SURGERY

## 2020-02-10 PROCEDURE — 3600000014 HC SURGERY LEVEL 4 ADDTL 15MIN: Performed by: SURGERY

## 2020-02-10 PROCEDURE — 3700000000 HC ANESTHESIA ATTENDED CARE: Performed by: SURGERY

## 2020-02-10 PROCEDURE — 7100000041 HC SPAR PHASE II RECOVERY - ADDTL 15 MIN: Performed by: SURGERY

## 2020-02-10 PROCEDURE — 3700000001 HC ADD 15 MINUTES (ANESTHESIA): Performed by: SURGERY

## 2020-02-10 PROCEDURE — C1760 CLOSURE DEV, VASC: HCPCS | Performed by: SURGERY

## 2020-02-10 PROCEDURE — 7100000000 HC PACU RECOVERY - FIRST 15 MIN: Performed by: SURGERY

## 2020-02-10 PROCEDURE — 6370000000 HC RX 637 (ALT 250 FOR IP): Performed by: ANESTHESIOLOGY

## 2020-02-10 PROCEDURE — 6360000002 HC RX W HCPCS: Performed by: NURSE ANESTHETIST, CERTIFIED REGISTERED

## 2020-02-10 PROCEDURE — 2709999900 HC NON-CHARGEABLE SUPPLY: Performed by: SURGERY

## 2020-02-10 PROCEDURE — 88304 TISSUE EXAM BY PATHOLOGIST: CPT

## 2020-02-10 PROCEDURE — 2500000003 HC RX 250 WO HCPCS: Performed by: NURSE ANESTHETIST, CERTIFIED REGISTERED

## 2020-02-10 PROCEDURE — 2580000003 HC RX 258: Performed by: ANESTHESIOLOGY

## 2020-02-10 PROCEDURE — 2580000003 HC RX 258: Performed by: SURGERY

## 2020-02-10 PROCEDURE — 6360000002 HC RX W HCPCS: Performed by: ANESTHESIOLOGY

## 2020-02-10 PROCEDURE — 2500000003 HC RX 250 WO HCPCS: Performed by: SURGERY

## 2020-02-10 PROCEDURE — 3600000004 HC SURGERY LEVEL 4 BASE: Performed by: SURGERY

## 2020-02-10 RX ORDER — ROCURONIUM BROMIDE 10 MG/ML
INJECTION, SOLUTION INTRAVENOUS PRN
Status: DISCONTINUED | OUTPATIENT
Start: 2020-02-10 | End: 2020-02-10 | Stop reason: SDUPTHER

## 2020-02-10 RX ORDER — ONDANSETRON 2 MG/ML
INJECTION INTRAMUSCULAR; INTRAVENOUS PRN
Status: DISCONTINUED | OUTPATIENT
Start: 2020-02-10 | End: 2020-02-10 | Stop reason: SDUPTHER

## 2020-02-10 RX ORDER — ACETAMINOPHEN 500 MG
1000 TABLET ORAL EVERY 6 HOURS PRN
COMMUNITY
End: 2020-05-19

## 2020-02-10 RX ORDER — FENTANYL CITRATE 50 UG/ML
50 INJECTION, SOLUTION INTRAMUSCULAR; INTRAVENOUS EVERY 5 MIN PRN
Status: DISCONTINUED | OUTPATIENT
Start: 2020-02-10 | End: 2020-02-10 | Stop reason: HOSPADM

## 2020-02-10 RX ORDER — DEXAMETHASONE SODIUM PHOSPHATE 10 MG/ML
INJECTION INTRAMUSCULAR; INTRAVENOUS PRN
Status: DISCONTINUED | OUTPATIENT
Start: 2020-02-10 | End: 2020-02-10 | Stop reason: SDUPTHER

## 2020-02-10 RX ORDER — ONDANSETRON 2 MG/ML
4 INJECTION INTRAMUSCULAR; INTRAVENOUS
Status: DISCONTINUED | OUTPATIENT
Start: 2020-02-10 | End: 2020-02-10 | Stop reason: HOSPADM

## 2020-02-10 RX ORDER — CEFAZOLIN SODIUM 1 G/3ML
INJECTION, POWDER, FOR SOLUTION INTRAMUSCULAR; INTRAVENOUS PRN
Status: DISCONTINUED | OUTPATIENT
Start: 2020-02-10 | End: 2020-02-10 | Stop reason: SDUPTHER

## 2020-02-10 RX ORDER — OXYCODONE HYDROCHLORIDE AND ACETAMINOPHEN 5; 325 MG/1; MG/1
1 TABLET ORAL EVERY 4 HOURS PRN
Status: DISCONTINUED | OUTPATIENT
Start: 2020-02-10 | End: 2020-02-10 | Stop reason: HOSPADM

## 2020-02-10 RX ORDER — KETOROLAC TROMETHAMINE 30 MG/ML
INJECTION, SOLUTION INTRAMUSCULAR; INTRAVENOUS PRN
Status: DISCONTINUED | OUTPATIENT
Start: 2020-02-10 | End: 2020-02-10 | Stop reason: SDUPTHER

## 2020-02-10 RX ORDER — LIDOCAINE HYDROCHLORIDE 10 MG/ML
INJECTION, SOLUTION EPIDURAL; INFILTRATION; INTRACAUDAL; PERINEURAL PRN
Status: DISCONTINUED | OUTPATIENT
Start: 2020-02-10 | End: 2020-02-10 | Stop reason: SDUPTHER

## 2020-02-10 RX ORDER — DIPHENHYDRAMINE HYDROCHLORIDE 50 MG/ML
12.5 INJECTION INTRAMUSCULAR; INTRAVENOUS
Status: DISCONTINUED | OUTPATIENT
Start: 2020-02-10 | End: 2020-02-10 | Stop reason: HOSPADM

## 2020-02-10 RX ORDER — IBUPROFEN 600 MG/1
600 TABLET ORAL EVERY 6 HOURS PRN
Qty: 120 TABLET | Refills: 0 | Status: ON HOLD | OUTPATIENT
Start: 2020-02-10 | End: 2020-05-20 | Stop reason: HOSPADM

## 2020-02-10 RX ORDER — MAGNESIUM HYDROXIDE 1200 MG/15ML
LIQUID ORAL CONTINUOUS PRN
Status: COMPLETED | OUTPATIENT
Start: 2020-02-10 | End: 2020-02-10

## 2020-02-10 RX ORDER — BUPIVACAINE HYDROCHLORIDE AND EPINEPHRINE 5; 5 MG/ML; UG/ML
INJECTION, SOLUTION EPIDURAL; INTRACAUDAL; PERINEURAL PRN
Status: DISCONTINUED | OUTPATIENT
Start: 2020-02-10 | End: 2020-02-10 | Stop reason: ALTCHOICE

## 2020-02-10 RX ORDER — OXYCODONE HYDROCHLORIDE AND ACETAMINOPHEN 5; 325 MG/1; MG/1
1 TABLET ORAL EVERY 4 HOURS PRN
Qty: 20 TABLET | Refills: 0 | Status: SHIPPED | OUTPATIENT
Start: 2020-02-10 | End: 2020-02-15

## 2020-02-10 RX ORDER — GLYCOPYRROLATE 1 MG/5 ML
SYRINGE (ML) INTRAVENOUS PRN
Status: DISCONTINUED | OUTPATIENT
Start: 2020-02-10 | End: 2020-02-10 | Stop reason: SDUPTHER

## 2020-02-10 RX ORDER — LABETALOL HYDROCHLORIDE 5 MG/ML
5 INJECTION, SOLUTION INTRAVENOUS EVERY 10 MIN PRN
Status: DISCONTINUED | OUTPATIENT
Start: 2020-02-10 | End: 2020-02-10 | Stop reason: HOSPADM

## 2020-02-10 RX ORDER — FENTANYL CITRATE 50 UG/ML
25 INJECTION, SOLUTION INTRAMUSCULAR; INTRAVENOUS EVERY 5 MIN PRN
Status: DISCONTINUED | OUTPATIENT
Start: 2020-02-10 | End: 2020-02-10 | Stop reason: HOSPADM

## 2020-02-10 RX ORDER — HYDRALAZINE HYDROCHLORIDE 20 MG/ML
5 INJECTION INTRAMUSCULAR; INTRAVENOUS EVERY 10 MIN PRN
Status: DISCONTINUED | OUTPATIENT
Start: 2020-02-10 | End: 2020-02-10 | Stop reason: HOSPADM

## 2020-02-10 RX ORDER — 0.9 % SODIUM CHLORIDE 0.9 %
500 INTRAVENOUS SOLUTION INTRAVENOUS
Status: DISCONTINUED | OUTPATIENT
Start: 2020-02-10 | End: 2020-02-10 | Stop reason: HOSPADM

## 2020-02-10 RX ORDER — ONDANSETRON 4 MG/1
4 TABLET, FILM COATED ORAL EVERY 8 HOURS PRN
Qty: 30 TABLET | Refills: 0 | Status: SHIPPED | OUTPATIENT
Start: 2020-02-10 | End: 2021-02-04

## 2020-02-10 RX ORDER — MIDAZOLAM HYDROCHLORIDE 1 MG/ML
INJECTION INTRAMUSCULAR; INTRAVENOUS PRN
Status: DISCONTINUED | OUTPATIENT
Start: 2020-02-10 | End: 2020-02-10 | Stop reason: SDUPTHER

## 2020-02-10 RX ORDER — PROMETHAZINE HYDROCHLORIDE 25 MG/ML
6.25 INJECTION, SOLUTION INTRAMUSCULAR; INTRAVENOUS
Status: DISCONTINUED | OUTPATIENT
Start: 2020-02-10 | End: 2020-02-10 | Stop reason: HOSPADM

## 2020-02-10 RX ORDER — DOCUSATE SODIUM 100 MG/1
100 CAPSULE, LIQUID FILLED ORAL 2 TIMES DAILY PRN
Qty: 60 CAPSULE | Refills: 0 | Status: SHIPPED | OUTPATIENT
Start: 2020-02-10 | End: 2020-03-11

## 2020-02-10 RX ORDER — PROPOFOL 10 MG/ML
INJECTION, EMULSION INTRAVENOUS PRN
Status: DISCONTINUED | OUTPATIENT
Start: 2020-02-10 | End: 2020-02-10 | Stop reason: SDUPTHER

## 2020-02-10 RX ORDER — FENTANYL CITRATE 50 UG/ML
INJECTION, SOLUTION INTRAMUSCULAR; INTRAVENOUS PRN
Status: DISCONTINUED | OUTPATIENT
Start: 2020-02-10 | End: 2020-02-10 | Stop reason: SDUPTHER

## 2020-02-10 RX ORDER — SODIUM CHLORIDE, SODIUM LACTATE, POTASSIUM CHLORIDE, CALCIUM CHLORIDE 600; 310; 30; 20 MG/100ML; MG/100ML; MG/100ML; MG/100ML
1000 INJECTION, SOLUTION INTRAVENOUS CONTINUOUS
Status: DISCONTINUED | OUTPATIENT
Start: 2020-02-10 | End: 2020-02-10 | Stop reason: HOSPADM

## 2020-02-10 RX ADMIN — FENTANYL CITRATE 50 MCG: 50 INJECTION, SOLUTION INTRAMUSCULAR; INTRAVENOUS at 13:11

## 2020-02-10 RX ADMIN — NEOSTIGMINE METHYLSULFATE 4 MG: 1 INJECTION, SOLUTION INTRAMUSCULAR; INTRAVENOUS; SUBCUTANEOUS at 12:27

## 2020-02-10 RX ADMIN — MIDAZOLAM HYDROCHLORIDE 2 MG: 1 INJECTION, SOLUTION INTRAMUSCULAR; INTRAVENOUS at 11:35

## 2020-02-10 RX ADMIN — OXYCODONE HYDROCHLORIDE AND ACETAMINOPHEN 1 TABLET: 5; 325 TABLET ORAL at 14:00

## 2020-02-10 RX ADMIN — SODIUM CHLORIDE, POTASSIUM CHLORIDE, SODIUM LACTATE AND CALCIUM CHLORIDE: 600; 310; 30; 20 INJECTION, SOLUTION INTRAVENOUS at 12:28

## 2020-02-10 RX ADMIN — CEFAZOLIN 2000 MG: 1 INJECTION, POWDER, FOR SOLUTION INTRAMUSCULAR; INTRAVENOUS at 11:46

## 2020-02-10 RX ADMIN — DEXAMETHASONE SODIUM PHOSPHATE 10 MG: 10 INJECTION INTRAMUSCULAR; INTRAVENOUS at 11:52

## 2020-02-10 RX ADMIN — KETOROLAC TROMETHAMINE 30 MG: 30 INJECTION, SOLUTION INTRAMUSCULAR at 12:27

## 2020-02-10 RX ADMIN — FENTANYL CITRATE 25 MCG: 50 INJECTION, SOLUTION INTRAMUSCULAR; INTRAVENOUS at 13:27

## 2020-02-10 RX ADMIN — FENTANYL CITRATE 100 MCG: 50 INJECTION, SOLUTION INTRAMUSCULAR; INTRAVENOUS at 11:36

## 2020-02-10 RX ADMIN — PROPOFOL 200 MG: 10 INJECTION, EMULSION INTRAVENOUS at 11:36

## 2020-02-10 RX ADMIN — Medication 0.6 MG: at 12:27

## 2020-02-10 RX ADMIN — FENTANYL CITRATE 25 MCG: 50 INJECTION, SOLUTION INTRAMUSCULAR; INTRAVENOUS at 13:49

## 2020-02-10 RX ADMIN — ROCURONIUM BROMIDE 30 MG: 10 INJECTION INTRAVENOUS at 11:36

## 2020-02-10 RX ADMIN — ONDANSETRON 4 MG: 2 INJECTION, SOLUTION INTRAMUSCULAR; INTRAVENOUS at 12:20

## 2020-02-10 RX ADMIN — SODIUM CHLORIDE, POTASSIUM CHLORIDE, SODIUM LACTATE AND CALCIUM CHLORIDE 1000 ML: 600; 310; 30; 20 INJECTION, SOLUTION INTRAVENOUS at 09:53

## 2020-02-10 RX ADMIN — ROCURONIUM BROMIDE 20 MG: 10 INJECTION INTRAVENOUS at 11:52

## 2020-02-10 RX ADMIN — FENTANYL CITRATE 50 MCG: 50 INJECTION, SOLUTION INTRAMUSCULAR; INTRAVENOUS at 11:52

## 2020-02-10 RX ADMIN — LIDOCAINE HYDROCHLORIDE 50 MG: 10 INJECTION, SOLUTION EPIDURAL; INFILTRATION; INTRACAUDAL; PERINEURAL at 11:36

## 2020-02-10 ASSESSMENT — PULMONARY FUNCTION TESTS
PIF_VALUE: 27
PIF_VALUE: 27
PIF_VALUE: 20
PIF_VALUE: 18
PIF_VALUE: 7
PIF_VALUE: 27
PIF_VALUE: 1
PIF_VALUE: 27
PIF_VALUE: 27
PIF_VALUE: 29
PIF_VALUE: 15
PIF_VALUE: 18
PIF_VALUE: 19
PIF_VALUE: 27
PIF_VALUE: 0
PIF_VALUE: 28
PIF_VALUE: 21
PIF_VALUE: 1
PIF_VALUE: 23
PIF_VALUE: 21
PIF_VALUE: 24
PIF_VALUE: 12
PIF_VALUE: 16
PIF_VALUE: 1
PIF_VALUE: 16
PIF_VALUE: 23
PIF_VALUE: 28
PIF_VALUE: 20
PIF_VALUE: 1
PIF_VALUE: 27
PIF_VALUE: 27
PIF_VALUE: 1
PIF_VALUE: 27
PIF_VALUE: 18
PIF_VALUE: 27
PIF_VALUE: 26
PIF_VALUE: 18
PIF_VALUE: 21
PIF_VALUE: 27
PIF_VALUE: 22
PIF_VALUE: 27
PIF_VALUE: 2
PIF_VALUE: 19
PIF_VALUE: 28
PIF_VALUE: 2
PIF_VALUE: 25
PIF_VALUE: 23
PIF_VALUE: 24
PIF_VALUE: 2
PIF_VALUE: 2
PIF_VALUE: 18
PIF_VALUE: 29
PIF_VALUE: 27
PIF_VALUE: 4
PIF_VALUE: 18
PIF_VALUE: 17
PIF_VALUE: 30
PIF_VALUE: 2
PIF_VALUE: 13
PIF_VALUE: 25
PIF_VALUE: 7
PIF_VALUE: 24
PIF_VALUE: 3
PIF_VALUE: 14
PIF_VALUE: 20
PIF_VALUE: 19
PIF_VALUE: 25
PIF_VALUE: 27

## 2020-02-10 ASSESSMENT — PAIN SCALES - GENERAL
PAINLEVEL_OUTOF10: 4
PAINLEVEL_OUTOF10: 5
PAINLEVEL_OUTOF10: 8
PAINLEVEL_OUTOF10: 4
PAINLEVEL_OUTOF10: 2
PAINLEVEL_OUTOF10: 8

## 2020-02-10 ASSESSMENT — ENCOUNTER SYMPTOMS
SHORTNESS OF BREATH: 0
ABDOMINAL DISTENTION: 0
DIARRHEA: 0
CHOKING: 0
ABDOMINAL PAIN: 1
EYE PAIN: 0
EYE REDNESS: 0
COLOR CHANGE: 0
APNEA: 0
BLOOD IN STOOL: 0
EYE DISCHARGE: 0

## 2020-02-10 ASSESSMENT — PAIN - FUNCTIONAL ASSESSMENT: PAIN_FUNCTIONAL_ASSESSMENT: 0-10

## 2020-02-10 ASSESSMENT — PAIN DESCRIPTION - PAIN TYPE: TYPE: SURGICAL PAIN

## 2020-02-10 ASSESSMENT — PAIN DESCRIPTION - LOCATION: LOCATION: ABDOMEN

## 2020-02-10 ASSESSMENT — PAIN DESCRIPTION - ORIENTATION: ORIENTATION: RIGHT;MID

## 2020-02-10 NOTE — OP NOTE
Patient: Eron Ramirez  YOB: 1980  MRN: [id number]  Date of Procedure: 2/10/2020    Pre-Op Diagnosis: SYMPTOMATIC CHOLELITHIASIS    Post-Op Diagnosis: Same       Procedure(s):  LAPAROSCOPIC CHOLECYSTECTOMY    Anesthesia: General    Surgeon(s):  Jaclyn Beltre DO    Assistant: Maye Varghese PGY4    Estimated Blood Loss (mL): 15    Complications: None    IVF: 1L crystalloid    WC: II    Specimens:   ID Type Source Tests Collected by Time Destination   A : GALLBLADDER AND CONTENTS Tissue Gallbladder SURGICAL PATHOLOGY Jaclyn Beltre DO 2/10/2020 1200        Implants:  * No implants in log *      Drains: * No LDAs found *    Findings: Large gallstone in infundibulum. Numerous fatty adhesions to the abdominal wall nancy-umbilical.       HISTORY: The patient is a 37y.o. year old female with history of above preop diagnosis. The risk, benefits, expected outcome, and alternatives to the procedure were explained to the patient's understanding and written informed consent was obtained. OPERATIVE DETAIL:  The patient was brought to the operating suite, was transferred to the operating table in supine. Timeout was performed verifying correct patient, position, equipment and procedure to be performed. EPC cuffs were applied. Preoperative antibiotics were infused. General anesthesia was induced. Endotracheal intubation was performed. The abdomen was prepped and draped in typical sterile fashion. A small stab incision was made at palmers point in the left upper quadrant. A vereese needle was introduced into the abdomen and confirmed with positive saline drop test. Pneumoperitoneum was established with CO2 gas with maximum pressure of 15 mm Hg which the patient tolerated well. A 11 blade scalpel was used to made a 11mm incision  subxiphoid and using a 11mm optivew trocar the abdomen was entered under direct visualization. Camera was placed into the port and no damage to the underlying structures was seen.

## 2020-02-10 NOTE — PROGRESS NOTES
Dr. Gurpreet Bland notified of continual drainage to one abdominal lap site. Site re-enforced with steristrips, 2x2 and tegaderm applied to site.

## 2020-02-10 NOTE — H&P
23.00     Pack years: 11.50    Smokeless tobacco: Never Used   Substance and Sexual Activity    Alcohol use: Yes     Comment: BEER-1 OR 2 A MONTH    Drug use: Never    Sexual activity: Yes     Partners: Male   Lifestyle    Physical activity:     Days per week: Not on file     Minutes per session: Not on file    Stress: Not on file   Relationships    Social connections:     Talks on phone: Not on file     Gets together: Not on file     Attends Jewish service: Not on file     Active member of club or organization: Not on file     Attends meetings of clubs or organizations: Not on file     Relationship status: Not on file    Intimate partner violence:     Fear of current or ex partner: Not on file     Emotionally abused: Not on file     Physically abused: Not on file     Forced sexual activity: Not on file   Other Topics Concern    Not on file   Social History Narrative    Not on file       Family History:       Problem Relation Age of Onset    Cancer Maternal Grandmother         LUNG    Cancer Maternal Grandfather         LUNG    Cancer Paternal Grandmother         ? BRAIN    Cancer Paternal Grandfather         THROAT       REVIEW OF SYSTEMS:    Review of Systems   Constitutional: Negative for activity change, chills and fatigue. HENT: Negative for congestion, drooling, ear pain and nosebleeds. Eyes: Negative for pain, discharge and redness. Respiratory: Negative for apnea, choking and shortness of breath. Cardiovascular: Negative for chest pain, palpitations and leg swelling. Gastrointestinal: Positive for abdominal pain (Due to symptomatic cholelithiasis and recurrent incisional hernia). Negative for abdominal distention, blood in stool and diarrhea. Endocrine: Negative for cold intolerance, polydipsia and polyphagia. Genitourinary: Negative for difficulty urinating, dysuria and frequency. Musculoskeletal: Negative for arthralgias, gait problem and myalgias.    Skin: Negative for color change, pallor and rash. Allergic/Immunologic: Negative for environmental allergies, food allergies and immunocompromised state. Neurological: Negative for facial asymmetry, light-headedness and numbness. Hematological: Negative for adenopathy. Does not bruise/bleed easily. Psychiatric/Behavioral: Negative for agitation, confusion and sleep disturbance. PHYSICAL EXAM:    VITALS:  [facility] 12/26/2019 (Exact Date)   INTAKE/OUTPUT: .No intake or output data in the 24 hours ending 02/10/20 0648    Physical Exam  Vitals signs reviewed. Constitutional:       Appearance: She is obese. She is not ill-appearing. HENT:      Head: Normocephalic and atraumatic. Right Ear: Tympanic membrane, ear canal and external ear normal.      Left Ear: Tympanic membrane, ear canal and external ear normal.      Nose: Nose normal. No rhinorrhea. Mouth/Throat:      Mouth: Mucous membranes are moist.      Pharynx: Oropharynx is clear. No oropharyngeal exudate. Eyes:      General: No scleral icterus. Conjunctiva/sclera: Conjunctivae normal.      Pupils: Pupils are equal, round, and reactive to light. Neck:      Musculoskeletal: Normal range of motion. No neck rigidity. Cardiovascular:      Rate and Rhythm: Normal rate and regular rhythm. Heart sounds: Normal heart sounds. Pulmonary:      Effort: Pulmonary effort is normal.      Breath sounds: Normal breath sounds. No stridor. No rhonchi. Abdominal:      General: There is no distension. Tenderness: There is no left CVA tenderness or guarding. Hernia: A hernia (Heart recurrent incisional to right of umbilicus) is present. Genitourinary:     General: Normal vulva. Vagina: No vaginal discharge. Rectum: Guaiac result negative. Musculoskeletal: Normal range of motion. General: No swelling or deformity. Lymphadenopathy:      Cervical: No cervical adenopathy. Skin:     General: Skin is warm.       Capillary Refill: Capillary refill takes less than 2 seconds. Coloration: Skin is not jaundiced. Findings: No bruising. Neurological:      General: No focal deficit present. Mental Status: She is alert and oriented to person, place, and time. Cranial Nerves: No cranial nerve deficit. Gait: Gait normal.   Psychiatric:         Mood and Affect: Mood normal.         Behavior: Behavior normal.         Thought Content: Thought content normal.           CBC:   Lab Results   Component Value Date    HGB 13.7 02/06/2020    HCT 41.1 02/06/2020     BMP:    Lab Results   Component Value Date     02/06/2020    K 4.3 02/06/2020     02/06/2020    CO2 22 02/06/2020    BUN 6 02/06/2020    CREATININE 0.65 02/06/2020    GFRAA >60 02/06/2020    LABGLOM >60 02/06/2020    GLUCOSE 122 02/06/2020       Pertinent Radiology:    No orders to display         ASSESSMENT   Symptomatic cholelithiasis    PLAN    1. Laparoscopic possible open cholecystectomy. Risks options and benefits of surgery discussed with patient who understands and agrees to proceed consent signed and witnessed and questions answered.         Electronically signed by Arjun Landa DO  on 2/10/2020 at 6:48 AM

## 2020-02-10 NOTE — ANESTHESIA PRE PROCEDURE
Ready to quit: Not Answered  Counseling given: Not Answered      Vital Signs (Current): There were no vitals filed for this visit. BP Readings from Last 3 Encounters:   02/06/20 137/80   07/08/19 116/67   05/08/19 (!) 112/58       NPO Status:                                                                                 BMI:   Wt Readings from Last 3 Encounters:   02/06/20 272 lb (123.4 kg)   07/08/19 295 lb (133.8 kg)   05/08/19 285 lb (129.3 kg)     There is no height or weight on file to calculate BMI.    CBC:   Lab Results   Component Value Date    HGB 13.7 02/06/2020    HCT 41.1 02/06/2020       CMP:   Lab Results   Component Value Date     02/06/2020    K 4.3 02/06/2020     02/06/2020    CO2 22 02/06/2020    BUN 6 02/06/2020    CREATININE 0.65 02/06/2020    GFRAA >60 02/06/2020    LABGLOM >60 02/06/2020    GLUCOSE 122 02/06/2020       POC Tests: No results for input(s): POCGLU, POCNA, POCK, POCCL, POCBUN, POCHEMO, POCHCT in the last 72 hours.     Coags: No results found for: PROTIME, INR, APTT    HCG (If Applicable): No results found for: PREGTESTUR, PREGSERUM, HCG, HCGQUANT     ABGs: No results found for: PHART, PO2ART, QCG8IXI, QBE1VMB, BEART, E1RZNRMA     Type & Screen (If Applicable):  No results found for: LABABO, [address]    Anesthesia Evaluation  Patient summary reviewed no history of anesthetic complications:   Airway: Mallampati: II        Dental:    (+) edentulous      Pulmonary:Negative Pulmonary ROS and normal exam  breath sounds clear to auscultation                             Cardiovascular:Negative CV ROS  Exercise tolerance: good (>4 METS),           Rhythm: regular  Rate: normal                    Neuro/Psych:   Negative Neuro/Psych ROS              GI/Hepatic/Renal:   (+) GERD: well controlled,      (-) liver disease and no renal disease       Endo/Other: Negative Endo/Other ROS       (-) diabetes mellitus,

## 2020-02-11 LAB — SURGICAL PATHOLOGY REPORT: NORMAL

## 2020-05-17 ENCOUNTER — HOSPITAL ENCOUNTER (OUTPATIENT)
Dept: PREADMISSION TESTING | Age: 40
Setting detail: SPECIMEN
Discharge: HOME | End: 2020-05-21
Payer: MEDICAID

## 2020-05-17 ENCOUNTER — HOSPITAL ENCOUNTER (OUTPATIENT)
Age: 40
Setting detail: SPECIMEN
Discharge: HOME | End: 2020-05-17
Payer: MEDICAID

## 2020-05-18 LAB
SARS-COV-2, PCR: NOT DETECTED
SARS-COV-2, RAPID: NORMAL
SARS-COV-2: NORMAL
SOURCE: NORMAL

## 2020-05-19 ENCOUNTER — TELEPHONE (OUTPATIENT)
Dept: PRIMARY CARE CLINIC | Age: 40
End: 2020-05-19

## 2020-05-19 ENCOUNTER — ANESTHESIA EVENT (OUTPATIENT)
Dept: OPERATING ROOM | Age: 40
End: 2020-05-19
Payer: MEDICAID

## 2020-05-20 ENCOUNTER — HOSPITAL ENCOUNTER (OUTPATIENT)
Age: 40
Discharge: HOME | End: 2020-05-20
Attending: SURGERY | Admitting: SURGERY
Payer: MEDICAID

## 2020-05-20 ENCOUNTER — ANESTHESIA (OUTPATIENT)
Dept: OPERATING ROOM | Age: 40
End: 2020-05-20
Payer: MEDICAID

## 2020-05-20 VITALS
DIASTOLIC BLOOD PRESSURE: 62 MMHG | HEIGHT: 71 IN | WEIGHT: 275 LBS | BODY MASS INDEX: 38.5 KG/M2 | SYSTOLIC BLOOD PRESSURE: 115 MMHG | TEMPERATURE: 97.5 F | RESPIRATION RATE: 13 BRPM | HEART RATE: 99 BPM | OXYGEN SATURATION: 96 %

## 2020-05-20 VITALS
OXYGEN SATURATION: 100 % | DIASTOLIC BLOOD PRESSURE: 64 MMHG | RESPIRATION RATE: 14 BRPM | TEMPERATURE: 96.8 F | SYSTOLIC BLOOD PRESSURE: 95 MMHG

## 2020-05-20 PROBLEM — K43.2 RECURRENT INCISIONAL HERNIA: Status: ACTIVE | Noted: 2020-05-20

## 2020-05-20 PROBLEM — K43.0 RECURRENT INCISIONAL HERNIA WITH INCARCERATION: Status: ACTIVE | Noted: 2020-05-20

## 2020-05-20 PROBLEM — E66.01 MORBID OBESITY (HCC): Chronic | Status: ACTIVE | Noted: 2020-05-20

## 2020-05-20 PROCEDURE — 6360000002 HC RX W HCPCS: Performed by: ANESTHESIOLOGY

## 2020-05-20 PROCEDURE — 6360000002 HC RX W HCPCS

## 2020-05-20 PROCEDURE — 6360000002 HC RX W HCPCS: Performed by: NURSE ANESTHETIST, CERTIFIED REGISTERED

## 2020-05-20 PROCEDURE — 3700000000 HC ANESTHESIA ATTENDED CARE: Performed by: SURGERY

## 2020-05-20 PROCEDURE — 2580000003 HC RX 258: Performed by: ANESTHESIOLOGY

## 2020-05-20 PROCEDURE — 7100000010 HC PHASE II RECOVERY - FIRST 15 MIN: Performed by: SURGERY

## 2020-05-20 PROCEDURE — 3600000009 HC SURGERY ROBOT BASE: Performed by: SURGERY

## 2020-05-20 PROCEDURE — 3700000001 HC ADD 15 MINUTES (ANESTHESIA): Performed by: SURGERY

## 2020-05-20 PROCEDURE — 2580000003 HC RX 258: Performed by: SURGERY

## 2020-05-20 PROCEDURE — 2709999900 HC NON-CHARGEABLE SUPPLY: Performed by: SURGERY

## 2020-05-20 PROCEDURE — 7100000001 HC PACU RECOVERY - ADDTL 15 MIN: Performed by: SURGERY

## 2020-05-20 PROCEDURE — 6370000000 HC RX 637 (ALT 250 FOR IP): Performed by: ANESTHESIOLOGY

## 2020-05-20 PROCEDURE — 7100000011 HC PHASE II RECOVERY - ADDTL 15 MIN: Performed by: SURGERY

## 2020-05-20 PROCEDURE — C1760 CLOSURE DEV, VASC: HCPCS | Performed by: SURGERY

## 2020-05-20 PROCEDURE — 7100000000 HC PACU RECOVERY - FIRST 15 MIN: Performed by: SURGERY

## 2020-05-20 PROCEDURE — S2900 ROBOTIC SURGICAL SYSTEM: HCPCS | Performed by: SURGERY

## 2020-05-20 PROCEDURE — 2500000003 HC RX 250 WO HCPCS: Performed by: SURGERY

## 2020-05-20 PROCEDURE — 2500000003 HC RX 250 WO HCPCS: Performed by: NURSE ANESTHETIST, CERTIFIED REGISTERED

## 2020-05-20 PROCEDURE — C1781 MESH (IMPLANTABLE): HCPCS | Performed by: SURGERY

## 2020-05-20 PROCEDURE — 6360000002 HC RX W HCPCS: Performed by: SURGERY

## 2020-05-20 PROCEDURE — 3600000019 HC SURGERY ROBOT ADDTL 15MIN: Performed by: SURGERY

## 2020-05-20 DEVICE — PATCH HERN L DIA3.2IN CIR W/ STRP SEPRA TECHNOLOGY ABSRB: Type: IMPLANTABLE DEVICE | Status: FUNCTIONAL

## 2020-05-20 RX ORDER — DEXAMETHASONE SODIUM PHOSPHATE 10 MG/ML
INJECTION INTRAMUSCULAR; INTRAVENOUS PRN
Status: DISCONTINUED | OUTPATIENT
Start: 2020-05-20 | End: 2020-05-20 | Stop reason: SDUPTHER

## 2020-05-20 RX ORDER — IBUPROFEN 800 MG/1
800 TABLET ORAL EVERY 8 HOURS
Qty: 40 TABLET | Refills: 0 | Status: SHIPPED | OUTPATIENT
Start: 2020-05-20 | End: 2021-02-01

## 2020-05-20 RX ORDER — ONDANSETRON 2 MG/ML
INJECTION INTRAMUSCULAR; INTRAVENOUS PRN
Status: DISCONTINUED | OUTPATIENT
Start: 2020-05-20 | End: 2020-05-20 | Stop reason: SDUPTHER

## 2020-05-20 RX ORDER — GLYCOPYRROLATE 1 MG/5 ML
SYRINGE (ML) INTRAVENOUS PRN
Status: DISCONTINUED | OUTPATIENT
Start: 2020-05-20 | End: 2020-05-20 | Stop reason: SDUPTHER

## 2020-05-20 RX ORDER — ONDANSETRON 2 MG/ML
4 INJECTION INTRAMUSCULAR; INTRAVENOUS DAILY PRN
Status: DISCONTINUED | OUTPATIENT
Start: 2020-05-20 | End: 2020-05-20 | Stop reason: HOSPADM

## 2020-05-20 RX ORDER — SCOLOPAMINE TRANSDERMAL SYSTEM 1 MG/1
1 PATCH, EXTENDED RELEASE TRANSDERMAL ONCE
Status: DISCONTINUED | OUTPATIENT
Start: 2020-05-20 | End: 2020-05-20 | Stop reason: HOSPADM

## 2020-05-20 RX ORDER — FENTANYL CITRATE 50 UG/ML
25 INJECTION, SOLUTION INTRAMUSCULAR; INTRAVENOUS EVERY 5 MIN PRN
Status: DISCONTINUED | OUTPATIENT
Start: 2020-05-20 | End: 2020-05-20 | Stop reason: HOSPADM

## 2020-05-20 RX ORDER — SODIUM CHLORIDE, SODIUM LACTATE, POTASSIUM CHLORIDE, CALCIUM CHLORIDE 600; 310; 30; 20 MG/100ML; MG/100ML; MG/100ML; MG/100ML
INJECTION, SOLUTION INTRAVENOUS CONTINUOUS
Status: DISCONTINUED | OUTPATIENT
Start: 2020-05-20 | End: 2020-05-20 | Stop reason: HOSPADM

## 2020-05-20 RX ORDER — 0.9 % SODIUM CHLORIDE 0.9 %
500 INTRAVENOUS SOLUTION INTRAVENOUS
Status: DISCONTINUED | OUTPATIENT
Start: 2020-05-20 | End: 2020-05-20 | Stop reason: HOSPADM

## 2020-05-20 RX ORDER — MAGNESIUM HYDROXIDE 1200 MG/15ML
LIQUID ORAL CONTINUOUS PRN
Status: COMPLETED | OUTPATIENT
Start: 2020-05-20 | End: 2020-05-20

## 2020-05-20 RX ORDER — CYCLOBENZAPRINE HCL 10 MG
10 TABLET ORAL 3 TIMES DAILY PRN
Qty: 21 TABLET | Refills: 0 | Status: SHIPPED | OUTPATIENT
Start: 2020-05-20 | End: 2020-05-30

## 2020-05-20 RX ORDER — HYDRALAZINE HYDROCHLORIDE 20 MG/ML
5 INJECTION INTRAMUSCULAR; INTRAVENOUS EVERY 10 MIN PRN
Status: DISCONTINUED | OUTPATIENT
Start: 2020-05-20 | End: 2020-05-20 | Stop reason: HOSPADM

## 2020-05-20 RX ORDER — FENTANYL CITRATE 50 UG/ML
50 INJECTION, SOLUTION INTRAMUSCULAR; INTRAVENOUS EVERY 5 MIN PRN
Status: DISCONTINUED | OUTPATIENT
Start: 2020-05-20 | End: 2020-05-20 | Stop reason: HOSPADM

## 2020-05-20 RX ORDER — MIDAZOLAM HYDROCHLORIDE 1 MG/ML
INJECTION INTRAMUSCULAR; INTRAVENOUS PRN
Status: DISCONTINUED | OUTPATIENT
Start: 2020-05-20 | End: 2020-05-20 | Stop reason: SDUPTHER

## 2020-05-20 RX ORDER — PROPOFOL 10 MG/ML
INJECTION, EMULSION INTRAVENOUS PRN
Status: DISCONTINUED | OUTPATIENT
Start: 2020-05-20 | End: 2020-05-20 | Stop reason: SDUPTHER

## 2020-05-20 RX ORDER — BUPIVACAINE HYDROCHLORIDE AND EPINEPHRINE 5; 5 MG/ML; UG/ML
INJECTION, SOLUTION EPIDURAL; INTRACAUDAL; PERINEURAL PRN
Status: DISCONTINUED | OUTPATIENT
Start: 2020-05-20 | End: 2020-05-20 | Stop reason: ALTCHOICE

## 2020-05-20 RX ORDER — FENTANYL CITRATE 50 UG/ML
INJECTION, SOLUTION INTRAMUSCULAR; INTRAVENOUS PRN
Status: DISCONTINUED | OUTPATIENT
Start: 2020-05-20 | End: 2020-05-20 | Stop reason: SDUPTHER

## 2020-05-20 RX ORDER — LIDOCAINE HYDROCHLORIDE 10 MG/ML
INJECTION, SOLUTION EPIDURAL; INFILTRATION; INTRACAUDAL; PERINEURAL PRN
Status: DISCONTINUED | OUTPATIENT
Start: 2020-05-20 | End: 2020-05-20 | Stop reason: SDUPTHER

## 2020-05-20 RX ORDER — DIPHENHYDRAMINE HYDROCHLORIDE 50 MG/ML
12.5 INJECTION INTRAMUSCULAR; INTRAVENOUS
Status: DISCONTINUED | OUTPATIENT
Start: 2020-05-20 | End: 2020-05-20 | Stop reason: HOSPADM

## 2020-05-20 RX ORDER — NEOSTIGMINE METHYLSULFATE 5 MG/5 ML
SYRINGE (ML) INTRAVENOUS PRN
Status: DISCONTINUED | OUTPATIENT
Start: 2020-05-20 | End: 2020-05-20 | Stop reason: SDUPTHER

## 2020-05-20 RX ORDER — ROCURONIUM BROMIDE 10 MG/ML
INJECTION, SOLUTION INTRAVENOUS PRN
Status: DISCONTINUED | OUTPATIENT
Start: 2020-05-20 | End: 2020-05-20 | Stop reason: SDUPTHER

## 2020-05-20 RX ORDER — OXYCODONE HYDROCHLORIDE AND ACETAMINOPHEN 5; 325 MG/1; MG/1
1 TABLET ORAL EVERY 6 HOURS PRN
Qty: 28 TABLET | Refills: 0 | Status: SHIPPED | OUTPATIENT
Start: 2020-05-20 | End: 2020-05-27

## 2020-05-20 RX ORDER — OXYCODONE HYDROCHLORIDE AND ACETAMINOPHEN 5; 325 MG/1; MG/1
1 TABLET ORAL EVERY 4 HOURS PRN
Status: DISCONTINUED | OUTPATIENT
Start: 2020-05-20 | End: 2020-05-20 | Stop reason: HOSPADM

## 2020-05-20 RX ORDER — LABETALOL HYDROCHLORIDE 5 MG/ML
5 INJECTION, SOLUTION INTRAVENOUS EVERY 10 MIN PRN
Status: DISCONTINUED | OUTPATIENT
Start: 2020-05-20 | End: 2020-05-20 | Stop reason: HOSPADM

## 2020-05-20 RX ORDER — KETOROLAC TROMETHAMINE 30 MG/ML
INJECTION, SOLUTION INTRAMUSCULAR; INTRAVENOUS PRN
Status: DISCONTINUED | OUTPATIENT
Start: 2020-05-20 | End: 2020-05-20 | Stop reason: SDUPTHER

## 2020-05-20 RX ORDER — PROMETHAZINE HYDROCHLORIDE 25 MG/ML
6.25 INJECTION, SOLUTION INTRAMUSCULAR; INTRAVENOUS
Status: DISCONTINUED | OUTPATIENT
Start: 2020-05-20 | End: 2020-05-20 | Stop reason: HOSPADM

## 2020-05-20 RX ORDER — DOCUSATE SODIUM 100 MG/1
100 CAPSULE, LIQUID FILLED ORAL 2 TIMES DAILY
Qty: 40 CAPSULE | Refills: 0 | Status: SHIPPED | OUTPATIENT
Start: 2020-05-20 | End: 2020-06-19

## 2020-05-20 RX ORDER — MIDAZOLAM HYDROCHLORIDE 1 MG/ML
1 INJECTION INTRAMUSCULAR; INTRAVENOUS EVERY 10 MIN PRN
Status: DISCONTINUED | OUTPATIENT
Start: 2020-05-20 | End: 2020-05-20 | Stop reason: HOSPADM

## 2020-05-20 RX ORDER — ONDANSETRON 2 MG/ML
4 INJECTION INTRAMUSCULAR; INTRAVENOUS
Status: DISCONTINUED | OUTPATIENT
Start: 2020-05-20 | End: 2020-05-20 | Stop reason: HOSPADM

## 2020-05-20 RX ORDER — LIDOCAINE HYDROCHLORIDE 10 MG/ML
1 INJECTION, SOLUTION EPIDURAL; INFILTRATION; INTRACAUDAL; PERINEURAL
Status: DISCONTINUED | OUTPATIENT
Start: 2020-05-20 | End: 2020-05-20 | Stop reason: HOSPADM

## 2020-05-20 RX ADMIN — FENTANYL CITRATE 50 MCG: 50 INJECTION, SOLUTION INTRAMUSCULAR; INTRAVENOUS at 10:38

## 2020-05-20 RX ADMIN — ROCURONIUM BROMIDE 5 MG: 10 INJECTION INTRAVENOUS at 09:17

## 2020-05-20 RX ADMIN — ROCURONIUM BROMIDE 10 MG: 10 INJECTION INTRAVENOUS at 08:02

## 2020-05-20 RX ADMIN — Medication 3 MG: at 09:35

## 2020-05-20 RX ADMIN — SODIUM CHLORIDE, POTASSIUM CHLORIDE, SODIUM LACTATE AND CALCIUM CHLORIDE: 600; 310; 30; 20 INJECTION, SOLUTION INTRAVENOUS at 09:27

## 2020-05-20 RX ADMIN — ONDANSETRON 4 MG: 2 INJECTION, SOLUTION INTRAMUSCULAR; INTRAVENOUS at 09:35

## 2020-05-20 RX ADMIN — FENTANYL CITRATE 50 MCG: 50 INJECTION INTRAMUSCULAR; INTRAVENOUS at 07:55

## 2020-05-20 RX ADMIN — MIDAZOLAM HYDROCHLORIDE 2 MG: 1 INJECTION, SOLUTION INTRAMUSCULAR; INTRAVENOUS at 07:27

## 2020-05-20 RX ADMIN — SODIUM CHLORIDE, POTASSIUM CHLORIDE, SODIUM LACTATE AND CALCIUM CHLORIDE: 600; 310; 30; 20 INJECTION, SOLUTION INTRAVENOUS at 06:40

## 2020-05-20 RX ADMIN — Medication 3 G: at 07:38

## 2020-05-20 RX ADMIN — DEXAMETHASONE SODIUM PHOSPHATE 10 MG: 10 INJECTION INTRAMUSCULAR; INTRAVENOUS at 07:38

## 2020-05-20 RX ADMIN — PROPOFOL 50 MG: 10 INJECTION, EMULSION INTRAVENOUS at 09:18

## 2020-05-20 RX ADMIN — Medication 0.6 MG: at 09:35

## 2020-05-20 RX ADMIN — LIDOCAINE HYDROCHLORIDE 50 MG: 10 INJECTION, SOLUTION EPIDURAL; INFILTRATION; INTRACAUDAL; PERINEURAL at 07:27

## 2020-05-20 RX ADMIN — FENTANYL CITRATE 100 MCG: 50 INJECTION INTRAMUSCULAR; INTRAVENOUS at 07:27

## 2020-05-20 RX ADMIN — PROPOFOL 200 MG: 10 INJECTION, EMULSION INTRAVENOUS at 07:27

## 2020-05-20 RX ADMIN — ROCURONIUM BROMIDE 50 MG: 10 INJECTION INTRAVENOUS at 07:27

## 2020-05-20 RX ADMIN — KETOROLAC TROMETHAMINE 30 MG: 30 INJECTION, SOLUTION INTRAMUSCULAR; INTRAVENOUS at 09:38

## 2020-05-20 ASSESSMENT — PULMONARY FUNCTION TESTS
PIF_VALUE: 27
PIF_VALUE: 18
PIF_VALUE: 20
PIF_VALUE: 18
PIF_VALUE: 27
PIF_VALUE: 26
PIF_VALUE: 5
PIF_VALUE: 24
PIF_VALUE: 27
PIF_VALUE: 23
PIF_VALUE: 21
PIF_VALUE: 28
PIF_VALUE: 27
PIF_VALUE: 25
PIF_VALUE: 27
PIF_VALUE: 28
PIF_VALUE: 2
PIF_VALUE: 26
PIF_VALUE: 27
PIF_VALUE: 26
PIF_VALUE: 26
PIF_VALUE: 28
PIF_VALUE: 28
PIF_VALUE: 26
PIF_VALUE: 19
PIF_VALUE: 22
PIF_VALUE: 26
PIF_VALUE: 27
PIF_VALUE: 25
PIF_VALUE: 19
PIF_VALUE: 26
PIF_VALUE: 27
PIF_VALUE: 23
PIF_VALUE: 25
PIF_VALUE: 28
PIF_VALUE: 25
PIF_VALUE: 1
PIF_VALUE: 20
PIF_VALUE: 21
PIF_VALUE: 28
PIF_VALUE: 27
PIF_VALUE: 25
PIF_VALUE: 28
PIF_VALUE: 26
PIF_VALUE: 27
PIF_VALUE: 26
PIF_VALUE: 27
PIF_VALUE: 27
PIF_VALUE: 23
PIF_VALUE: 11
PIF_VALUE: 27
PIF_VALUE: 1
PIF_VALUE: 1
PIF_VALUE: 28
PIF_VALUE: 27
PIF_VALUE: 18
PIF_VALUE: 28
PIF_VALUE: 29
PIF_VALUE: 22
PIF_VALUE: 26
PIF_VALUE: 27
PIF_VALUE: 27
PIF_VALUE: 28
PIF_VALUE: 20
PIF_VALUE: 27
PIF_VALUE: 24
PIF_VALUE: 27
PIF_VALUE: 0
PIF_VALUE: 26
PIF_VALUE: 26
PIF_VALUE: 1
PIF_VALUE: 20
PIF_VALUE: 26
PIF_VALUE: 20
PIF_VALUE: 26
PIF_VALUE: 28
PIF_VALUE: 28
PIF_VALUE: 23
PIF_VALUE: 26
PIF_VALUE: 14
PIF_VALUE: 27
PIF_VALUE: 28
PIF_VALUE: 20
PIF_VALUE: 27
PIF_VALUE: 27
PIF_VALUE: 15
PIF_VALUE: 28
PIF_VALUE: 28
PIF_VALUE: 27
PIF_VALUE: 24
PIF_VALUE: 28
PIF_VALUE: 25
PIF_VALUE: 19
PIF_VALUE: 20
PIF_VALUE: 27
PIF_VALUE: 27
PIF_VALUE: 18
PIF_VALUE: 28
PIF_VALUE: 28
PIF_VALUE: 24
PIF_VALUE: 26
PIF_VALUE: 26
PIF_VALUE: 27
PIF_VALUE: 28
PIF_VALUE: 25
PIF_VALUE: 27
PIF_VALUE: 25
PIF_VALUE: 26
PIF_VALUE: 24
PIF_VALUE: 20
PIF_VALUE: 25
PIF_VALUE: 0
PIF_VALUE: 27
PIF_VALUE: 25
PIF_VALUE: 28
PIF_VALUE: 27
PIF_VALUE: 19
PIF_VALUE: 18
PIF_VALUE: 28
PIF_VALUE: 2
PIF_VALUE: 28
PIF_VALUE: 27
PIF_VALUE: 25
PIF_VALUE: 30
PIF_VALUE: 27
PIF_VALUE: 27
PIF_VALUE: 18
PIF_VALUE: 27
PIF_VALUE: 28
PIF_VALUE: 28
PIF_VALUE: 26
PIF_VALUE: 26
PIF_VALUE: 25
PIF_VALUE: 18
PIF_VALUE: 21
PIF_VALUE: 25
PIF_VALUE: 26
PIF_VALUE: 27
PIF_VALUE: 27
PIF_VALUE: 25
PIF_VALUE: 25
PIF_VALUE: 19
PIF_VALUE: 28
PIF_VALUE: 18
PIF_VALUE: 4
PIF_VALUE: 26

## 2020-05-20 ASSESSMENT — PAIN DESCRIPTION - FREQUENCY: FREQUENCY: CONTINUOUS

## 2020-05-20 ASSESSMENT — ENCOUNTER SYMPTOMS
ANAL BLEEDING: 0
CONSTIPATION: 0
SHORTNESS OF BREATH: 0
EYE PAIN: 0
PHOTOPHOBIA: 0
COLOR CHANGE: 0
ABDOMINAL DISTENTION: 0
APNEA: 0
EYE DISCHARGE: 0
CHOKING: 0

## 2020-05-20 ASSESSMENT — PAIN SCALES - GENERAL
PAINLEVEL_OUTOF10: 3
PAINLEVEL_OUTOF10: 8
PAINLEVEL_OUTOF10: 2

## 2020-05-20 ASSESSMENT — PAIN DESCRIPTION - ORIENTATION: ORIENTATION: MID;LOWER

## 2020-05-20 ASSESSMENT — PAIN DESCRIPTION - ONSET: ONSET: AWAKENED FROM SLEEP

## 2020-05-20 ASSESSMENT — PAIN DESCRIPTION - PROGRESSION: CLINICAL_PROGRESSION: GRADUALLY WORSENING

## 2020-05-20 ASSESSMENT — PAIN - FUNCTIONAL ASSESSMENT: PAIN_FUNCTIONAL_ASSESSMENT: 0-10

## 2020-05-20 ASSESSMENT — PAIN DESCRIPTION - DESCRIPTORS: DESCRIPTORS: CRAMPING

## 2020-05-20 ASSESSMENT — PAIN DESCRIPTION - PAIN TYPE: TYPE: SURGICAL PAIN

## 2020-05-20 ASSESSMENT — PAIN DESCRIPTION - LOCATION: LOCATION: ABDOMEN

## 2020-05-20 NOTE — H&P
bleeding and constipation. Endocrine: Negative for cold intolerance, polydipsia and polyphagia. Genitourinary: Negative for difficulty urinating, dysuria and frequency. Musculoskeletal: Negative for arthralgias, gait problem and myalgias. Skin: Negative for color change, pallor and rash. Allergic/Immunologic: Negative for environmental allergies, food allergies and immunocompromised state. Neurological: Negative for dizziness, seizures, numbness and headaches. Hematological: Negative for adenopathy. Does not bruise/bleed easily. Psychiatric/Behavioral: Negative for agitation, confusion and dysphoric mood. PHYSICAL EXAM:    VITALS:  /73   Pulse 91   Temp 97.7 °F (36.5 °C) (Temporal)   Resp 18   Ht 5' 11\" (1.803 m)   Wt 275 lb (124.7 kg)   LMP 05/19/2020 Comment: tubal ligation  SpO2 98%   BMI 38.35 kg/m²   INTAKE/OUTPUT: .No intake or output data in the 24 hours ending 05/20/20 0706    Physical Exam  Vitals signs reviewed. Constitutional:       General: She is not in acute distress. Appearance: She is obese. She is not ill-appearing. HENT:      Head: Normocephalic and atraumatic. Right Ear: Tympanic membrane and external ear normal.      Left Ear: Tympanic membrane and external ear normal.      Nose: Nose normal. No congestion. Mouth/Throat:      Mouth: Mucous membranes are moist.      Pharynx: Oropharynx is clear. No posterior oropharyngeal erythema. Eyes:      General: No scleral icterus. Extraocular Movements: Extraocular movements intact. Conjunctiva/sclera: Conjunctivae normal.      Pupils: Pupils are equal, round, and reactive to light. Neck:      Musculoskeletal: Normal range of motion and neck supple. Cardiovascular:      Rate and Rhythm: Normal rate and regular rhythm. Pulses: Normal pulses. Pulmonary:      Effort: Pulmonary effort is normal.      Breath sounds: Normal breath sounds. No rhonchi.    Abdominal:      General: Abdomen

## 2020-05-20 NOTE — ANESTHESIA POSTPROCEDURE EVALUATION
Department of Anesthesiology  Postprocedure Note    Patient: Lili Davenport  MRN: [id number]  [place]: 1980  Date of evaluation: 5/20/2020  Time:  11:08 AM     Procedure Summary     Date:  05/20/20 Room / Location:  [address]    Anesthesia Start:  7805 Anesthesia Stop:  3293    Procedure:  XI ROBOTIC LAPAROSCOPIC INCISIONAL HERNIA REPAIR WITH MESH, LYSIS OF ADHESIONS (N/A ) Diagnosis:  (INCISIONAL HERNIA)    Surgeon:  Travon Taylor DO Responsible Provider:  Suzy Askew MD    Anesthesia Type:  general ASA Status:  2          Anesthesia Type: general    John Phase I: John Score: 8    John Phase II:      Last vitals: Reviewed and per EMR flowsheets.        Anesthesia Post Evaluation    Patient location during evaluation: PACU  Patient participation: complete - patient participated  Level of consciousness: awake  Pain score: 1  Airway patency: patent  Nausea & Vomiting: no nausea and no vomiting  Complications: no  Cardiovascular status: blood pressure returned to baseline and hemodynamically stable  Respiratory status: acceptable  Hydration status: euvolemic

## 2020-05-20 NOTE — H&P
General Surgery   Pre-Operative History and Physical Examination      PATIENT NAME: Moira Coleman OF BIRTH: 1980    ADMISSION DATE: 5/20/2020  5:53 AM      TODAY'S DATE: 5/20/2020      HISTORY OF PRESENT ILLNESS:  The patient is a 37 y.o. pleasant female who presents to outpatient surgery for a planned robotic assisted lap hernia repair with mesh, possible open. She reports that she has had 5 previous hernia repairs to a midline ventral hernia in the supraumbilical region and since the last repair in [place] that hasn't been an issue for her. Now she has developed a hernia on the right side of her abdomen, at the level of the umbilicus/just beneath which has been causing her discomfort and she met with Dr. Horacio Rider to discuss repair. The risks, benefits, and alternatives were discussed with the patient. Perioperative preparation was discussed and all questions were answered. The patient expresses understanding of the intervention and consent was obtained with an witness. She denies any other major issues since seeing Dr. Horacio Rider in the office. Of note, she did have a previous laparoscopic cholecystectomy with Dr. Horacio Rider 3 months ago and has been doing well afterwards, tolerating a diet and normal bowel movements. She is prepared to proceed with hernia repair this morning.        Past Medical History:        Diagnosis Date    GERD (gastroesophageal reflux disease) 02/2020    ON RX    Hernia, ventral     Ventral hernia 2018    repaired x 3 with mesh, has another hernia as of 2020    Wears dentures     UPPER AND LOWER BUT DOES NOT WEAR    Wears glasses        Past Surgical History:        Procedure Laterality Date    CHOLECYSTECTOMY, LAPAROSCOPIC  02/10/2020    CHOLECYSTECTOMY, LAPAROSCOPIC N/A 2/10/2020    LAPAROSCOPIC CHOLECYSTECTOMY performed by Darvin Soliz DO at [address]  [address]96    UMBILICAL WITH [address]  2015 02/06/2020    HCT 41.1 02/06/2020     BMP:    Lab Results   Component Value Date     02/06/2020    K 4.3 02/06/2020     02/06/2020    CO2 22 02/06/2020    BUN 6 02/06/2020    CREATININE 0.65 02/06/2020    GFRAA >60 02/06/2020    LABGLOM >60 02/06/2020    GLUCOSE 122 02/06/2020       Pertinent Radiology:     No new imaging       ASSESSMENT     Right sided ventral hernia. No evidence of strangulation. Hx of multiple previous supraumbilical ventral hernia repairs    PLAN    1. Discussed operative plans with the patient and possibility of open repair if unable to complete laparoscopically due to previous surgery. Patient fully understands and consent has been obtained, witnessed and in the chart. 2. Will proceed to the OR this morning for repair. 3. Post operative orders to follow.       ------------------------------------------------------------------  Maximiliano Ceron DO, PGY-4  Department of [address] [place] [address].  5/20/2020 at 7:01 AM

## 2020-05-20 NOTE — ANESTHESIA PRE PROCEDURE
Department of Anesthesiology  Preprocedure Note       Name:  Jenny Sanz   Age:  37 y.o.  :  1980                                          MRN:  [id number]         Date:  2020      Surgeon: Gina Powell):  Deborah Reed DO    Procedure: XI ROBOTIC LAPAROSCOPIC INCISIONAL HERNIA REPAIR (N/A )    Medications prior to admission:   Prior to Admission medications    Medication Sig Start Date End Date Taking? Authorizing Provider   ibuprofen (ADVIL;MOTRIN) 600 MG tablet Take 1 tablet by mouth every 6 hours as needed for Pain 2/10/20   Maximo Jacobsen DO   ondansetron [facility]) 4 MG tablet Take 1 tablet by mouth every 8 hours as needed for Nausea or Vomiting 2/10/20   Maximo Jacobsen DO   famotidine (PEPCID) 20 MG tablet Take 20 mg by mouth 2 times daily    Historical Provider, MD       Current medications:    No current facility-administered medications for this visit. No current outpatient medications on file. Facility-Administered Medications Ordered in Other Visits   Medication Dose Route Frequency Provider Last Rate Last Dose    ceFAZolin (ANCEF) 3 g in dextrose 5 % 100 mL IVPB  3 g Intravenous Once Charli Flowers DO        lactated ringers infusion   Intravenous Continuous Janis Beebe  mL/hr at 20 0640         Allergies:     Allergies   Allergen Reactions    Seasonal        Problem List:    Patient Active Problem List   Diagnosis Code    Cholelithiasis and cholecystitis without obstruction K80.10       Past Medical History:        Diagnosis Date    GERD (gastroesophageal reflux disease) 2020    ON RX    Hernia, ventral     Ventral hernia 2018    repaired x 3 with mesh, has another hernia as of     Wears dentures     UPPER AND LOWER BUT DOES NOT WEAR    Wears glasses        Past Surgical History:        Procedure Laterality Date    CHOLECYSTECTOMY, LAPAROSCOPIC  02/10/2020    CHOLECYSTECTOMY, LAPAROSCOPIC N/A 2/10/2020    LAPAROSCOPIC CHOLECYSTECTOMY performed by Suresh Harris, DO at [address]  [address]    UMBILICAL WITH MESH    HERNIA REPAIR  9139    UMBILICAL  WITH MESH    TUBAL LIGATION  2005       Social History:    Social History     Tobacco Use    Smoking status: Current Every Day Smoker     Packs/day: 0.00     Years: 23.00     Pack years: 0.00    Smokeless tobacco: Never Used    Tobacco comment: about 3 cigarettes per day   Substance Use Topics    Alcohol use: Yes     Comment: BEER-1 OR 2 A MONTH                                Ready to quit: Not Answered  Counseling given: Not Answered  Comment: about 3 cigarettes per day      Vital Signs (Current): There were no vitals filed for this visit. BP Readings from Last 3 Encounters:   05/20/20 108/73   02/06/20 137/80   02/10/20 124/68       NPO Status:                                                                                 BMI:   Wt Readings from Last 3 Encounters:   05/20/20 275 lb (124.7 kg)   02/06/20 272 lb (123.4 kg)   02/10/20 272 lb (123.4 kg)     There is no height or weight on file to calculate BMI.    CBC:   Lab Results   Component Value Date    HGB 13.7 02/06/2020    HCT 41.1 02/06/2020       CMP:   Lab Results   Component Value Date     02/06/2020    K 4.3 02/06/2020     02/06/2020    CO2 22 02/06/2020    BUN 6 02/06/2020    CREATININE 0.65 02/06/2020    GFRAA >60 02/06/2020    LABGLOM >60 02/06/2020    GLUCOSE 122 02/06/2020       POC Tests: No results for input(s): POCGLU, POCNA, POCK, POCCL, POCBUN, POCHEMO, POCHCT in the last 72 hours.     Coags: No results found for: PROTIME, INR, APTT    HCG (If Applicable): No results found for: PREGTESTUR, PREGSERUM, HCG, HCGQUANT     ABGs: No results found for: PHART, PO2ART, DRY4CBU, LFO1VOW, BEART, H2FWNMNE     Type & Screen (If Applicable):  No results found for: LABABO, [address]    Anesthesia Evaluation  Patient summary reviewed no history of anesthetic complications:   Airway: Mallampati: II        Dental:    (+) edentulous      Pulmonary:Negative Pulmonary ROS and normal exam  breath sounds clear to auscultation                             Cardiovascular:Negative CV ROS  Exercise tolerance: good (>4 METS),           Rhythm: regular  Rate: normal                    Neuro/Psych:   Negative Neuro/Psych ROS              GI/Hepatic/Renal:   (+) GERD: well controlled,      (-) liver disease and no renal disease       Endo/Other: Negative Endo/Other ROS       (-) diabetes mellitus, hypothyroidism               Abdominal:           Vascular: negative vascular ROS. s/p tubal    SARS-CoV-2, PCR Not Detected  Not Detected Final 05/17/2020  3:34             Anesthesia Plan      general     ASA 2       Induction: intravenous. MIPS: Prophylactic antiemetics administered. Anesthetic plan and risks discussed with patient. Plan discussed with CRNA.                   Yessenia Tai MD   5/20/2020

## 2020-05-21 NOTE — OP NOTE
personnel present, the patient ID and the proper procedure to be performed. Antibiotics were given in accordance with SCIP. EPC cuffs were placed for perioperative DVT prophylaxis and a vangie hugger was placed to keep the patient euthermic during the operation. We then prepped and draped the patient's abdomen in the usual sterile fashion to proceed with the robotic assisted lap incisional hernia repair. Using #11 blade a 12mm incision was made in the left upper quadrant and the Veress needle was inserted into the peritoneal cavity through this incision. The saline drop test confirmed entrance into the abdominal cavity and pneumoperitoneum was established to 15mmHg. The optiview trocar was inserted into the incision and entrance was gained into the left upper quadrant under direct visualization without complication. Once within the abdomen we evaluated the underlying abdominal contents for injury of which there was none. There was some omentum adhered to the anterior abdominal wall obscuring the location of the defect initially, but not prohibitive to robotic surgery. With this in mind we proceeded to place three additional working robotic ports along the left lateral aspect of her abdomen, triangulating the hernia defect to be repaired. The patient was then airplaned to her left side to aid in exposure and the Da Emelia robot was docked. We placed a fenestrated bipolar in our left arm and the hot scissors in the right arm. At this point we then began taking down the adhesions between the omentum and the anterior abdominal wall. Hemostasis was obtained along the way using the fenestrated bipolar and ultimately we arrived at the location of the hernia which had a significant amount of omentum herniating through the defect and chronically incarcerated; however, no bowel within the hernia defect.  Using a combination of sharp dissection with scissors/cautery and blunt dissection the omentum was fully reduced from DO on 5/21/2020 at 6:47 AM

## 2021-02-01 ENCOUNTER — APPOINTMENT (OUTPATIENT)
Dept: GENERAL RADIOLOGY | Age: 41
End: 2021-02-01
Payer: MEDICAID

## 2021-02-01 ENCOUNTER — HOSPITAL ENCOUNTER (EMERGENCY)
Age: 41
Discharge: HOME | End: 2021-02-01
Attending: EMERGENCY MEDICINE
Payer: MEDICAID

## 2021-02-01 VITALS
RESPIRATION RATE: 16 BRPM | SYSTOLIC BLOOD PRESSURE: 146 MMHG | TEMPERATURE: 98.2 F | DIASTOLIC BLOOD PRESSURE: 78 MMHG | WEIGHT: 265 LBS | OXYGEN SATURATION: 99 % | BODY MASS INDEX: 37.1 KG/M2 | HEART RATE: 90 BPM | HEIGHT: 71 IN

## 2021-02-01 DIAGNOSIS — M79.642 LEFT HAND PAIN: ICD-10-CM

## 2021-02-01 DIAGNOSIS — M25.532 LEFT WRIST PAIN: Primary | ICD-10-CM

## 2021-02-01 PROCEDURE — 6360000002 HC RX W HCPCS: Performed by: EMERGENCY MEDICINE

## 2021-02-01 PROCEDURE — 73130 X-RAY EXAM OF HAND: CPT

## 2021-02-01 PROCEDURE — 96372 THER/PROPH/DIAG INJ SC/IM: CPT

## 2021-02-01 PROCEDURE — 73110 X-RAY EXAM OF WRIST: CPT

## 2021-02-01 PROCEDURE — 99282 EMERGENCY DEPT VISIT SF MDM: CPT

## 2021-02-01 RX ORDER — IBUPROFEN 600 MG/1
600 TABLET ORAL 3 TIMES DAILY PRN
Qty: 30 TABLET | Refills: 0 | Status: SHIPPED | OUTPATIENT
Start: 2021-02-01 | End: 2021-03-03

## 2021-02-01 RX ORDER — IBUPROFEN 200 MG
400 TABLET ORAL EVERY 6 HOURS PRN
COMMUNITY
End: 2021-02-01

## 2021-02-01 RX ORDER — KETOROLAC TROMETHAMINE 30 MG/ML
30 INJECTION, SOLUTION INTRAMUSCULAR; INTRAVENOUS ONCE
Status: COMPLETED | OUTPATIENT
Start: 2021-02-01 | End: 2021-02-01

## 2021-02-01 RX ADMIN — KETOROLAC TROMETHAMINE 30 MG: 30 INJECTION, SOLUTION INTRAMUSCULAR; INTRAVENOUS at 12:20

## 2021-02-01 ASSESSMENT — PAIN SCALES - GENERAL: PAINLEVEL_OUTOF10: 7

## 2021-02-01 ASSESSMENT — ENCOUNTER SYMPTOMS
COLOR CHANGE: 0
SHORTNESS OF BREATH: 0
BACK PAIN: 0
ABDOMINAL PAIN: 0
EYE PAIN: 0

## 2021-02-01 NOTE — ED PROVIDER NOTES
EMERGENCY DEPARTMENT ENCOUNTER    Pt Name: [place]  MRN: [id number]  [place] 1980  Date of evaluation: 2/1/21  CHIEF COMPLAINT       Chief Complaint   Patient presents with    Hand Pain     Lt hand pain with no known injury     HISTORY OF PRESENT ILLNESS   73-year-old female presents complaint of left hand and wrist pain. Patient has a known history of carpal tunnel in that hand, states that it was getting better, however she started a new job recently where she is using her hands a lot. Patient states that the last few weeks has been having worsening pain. Patient states that pain is in the left side of her wrist and the left thumb, denies any known other trauma falls or other injuries. The history is provided by the patient. REVIEW OF SYSTEMS     Review of Systems   Constitutional: Negative for fever. HENT: Negative for congestion and ear pain. Eyes: Negative for pain. Respiratory: Negative for shortness of breath. Cardiovascular: Negative for chest pain, palpitations and leg swelling. Gastrointestinal: Negative for abdominal pain. Genitourinary: Negative for dysuria and flank pain. Musculoskeletal: Negative for back pain. Left wrist and hand pain   Skin: Negative for color change. Neurological: Negative for numbness and headaches. Psychiatric/Behavioral: Negative for confusion. All other systems reviewed and are negative.     PASTMEDICAL HISTORY     Past Medical History:   Diagnosis Date    GERD (gastroesophageal reflux disease) 02/2020    ON RX    Hernia, ventral     Ventral hernia 2018    repaired x 3 with mesh, has another hernia as of 2020    Wears dentures     UPPER AND LOWER BUT DOES NOT WEAR    Wears glasses      Past Problem List  Patient Active Problem List   Diagnosis Code    Cholelithiasis and cholecystitis without obstruction K80.10    Recurrent incisional hernia with incarceration K43.0    Morbid obesity ([address] E66.01    Recurrent incisional hernia K43.2     SURGICAL HISTORY       Past Surgical History:   Procedure Laterality Date    CHOLECYSTECTOMY, LAPAROSCOPIC N/A 2/10/2020    LAPAROSCOPIC CHOLECYSTECTOMY performed by Sully Lombardi DO at [address]  10[address]    UMBILICAL WITH MESH    HERNIA REPAIR  3162    UMBILICAL  WITH MESH    HERNIA REPAIR N/A 2020    XI ROBOTIC LAPAROSCOPIC [address] [place], LYSIS OF ADHESIONS performed by Sully Lombardi DO at [address]  2020    ROBOTIC [address] [facility], LYSIS OF ADHESIONS     TUBAL LIGATION       CURRENT MEDICATIONS       Discharge Medication List as of 2021 11:51 AM      CONTINUE these medications which have NOT CHANGED    Details   ondansetron (ZOFRAN) 4 MG tablet Take 1 tablet by mouth every 8 hours as needed for Nausea or Vomiting, Disp-30 tablet, R-0Print      famotidine (PEPCID) 20 MG tablet Take 20 mg by mouth 2 times dailyHistorical Med           ALLERGIES     is allergic to seasonal.  FAMILY HISTORY     She indicated that her mother is alive. She indicated that her father is alive. She indicated that both of her brothers are alive. She indicated that her maternal grandmother is . She indicated that her maternal grandfather is . She indicated that her paternal grandmother is . She indicated that her paternal grandfather is .      SOCIAL HISTORY       Social History     Tobacco Use    Smoking status: Current Every Day Smoker     Packs/day: 1.00     Years: 25.00     Pack years: 25.00     Types: Cigarettes    Smokeless tobacco: Never Used   Substance Use Topics    Alcohol use: Yes     Comment: BEER-1 OR 2 A MONTH    Drug use: Never     PHYSICAL EXAM     INITIAL VITALS: BP (!) 146/78   Pulse 90   Temp 98.2 °F (36.8 °C) (Oral)   Resp 16   Ht 5' 11\" (1.803 m)   Wt 265 lb (120.2 kg)   LMP 2020   SpO2 99%   BMI 36.96 kg/m²    Physical Exam  Vitals signs and nursing note reviewed. Constitutional:       General: She is not in acute distress. Appearance: Normal appearance. She is obese. She is not toxic-appearing. HENT:      Head: Normocephalic and atraumatic. Nose: Nose normal.      Mouth/Throat:      Mouth: Mucous membranes are moist.      Pharynx: Oropharynx is clear. Eyes:      Extraocular Movements: Extraocular movements intact. Conjunctiva/sclera: Conjunctivae normal.   Neck:      Musculoskeletal: Normal range of motion. Cardiovascular:      Rate and Rhythm: Normal rate and regular rhythm. Pulses: Normal pulses. Heart sounds: Normal heart sounds. Pulmonary:      Effort: Pulmonary effort is normal.      Breath sounds: Normal breath sounds. Abdominal:      General: Bowel sounds are normal. There is no distension. Palpations: Abdomen is soft. Tenderness: There is no abdominal tenderness. Musculoskeletal: Normal range of motion. Comments: Numbness over the left wrist, positive Tinel's, positive Phalen's test   Skin:     General: Skin is warm and dry. Capillary Refill: Capillary refill takes less than 2 seconds. Neurological:      General: No focal deficit present. Mental Status: She is alert. Psychiatric:         Mood and Affect: Mood normal.         MEDICAL DECISION MAKIN-year-old female presents with complaint of left wrist pain, on initial exam patient no acute distress, vitals are stable, patient with known history of carpal tunnel, patient with positive Tinel's and positive Phalen's test, will obtain x-ray to evaluate for other injury, provide symptomatic treatment    xrays were reviewed negative for acute process    Results were discussed the patient, discussed likely worsening of her carpal tunnel, discussed use of NSAIDs, will also provide patient with a splint, will also provide orthopedic follow-up.   Patient Medications    ketorolac (TORADOL) injection 30 mg    ibuprofen (ADVIL;MOTRIN) 600 MG tablet     Sig: Take 1 tablet by mouth 3 times daily as needed for Pain     Dispense:  30 tablet     Refill:  0     CONSULTS:  None    FINAL IMPRESSION      1. Left wrist pain    2.  Left hand pain          DISPOSITION/PLAN   DISPOSITION Decision To Discharge 02/01/2021 11:51:34 AM      PATIENT REFERRED TO:  [facility] ED  [address]  [address] [facility]  [phone]    As needed, If symptoms worsen    [facility]  [address] [facility]  [phone]  Schedule an appointment as soon as possible for a visit       Daphnie Cano MD  [place] [address], [address] [facility]  [address] [facility] [address] [phone]    Schedule an appointment as soon as possible for a visit       DISCHARGE MEDICATIONS:  Discharge Medication List as of 2/1/2021 11:51 AM        Anne-Marie Polanco DO  Attending Emergency Physician                  Anne-Marie Polanco DO  02/01/21 1341

## 2021-02-01 NOTE — ED NOTES
Pt comes to this ER with c/o lt hand pain that started on 01/26/2021. Pt denies any known injuries but states the pain has been increasing in severity. Pt has a hx of carpal tunnel syndrome but states this pain is different than her carpal tunnel pain. Pt arrives A+O x 4, GCS = 15, PMS x 4 intact, eupneic, and PWD. Pt has no obvious deformity or injury visible. Brisk cap refill noted in pt's lt fingers.      Iris Castro RN  02/01/21 [id number]

## 2021-02-04 ENCOUNTER — OFFICE VISIT (OUTPATIENT)
Dept: ORTHOPEDIC SURGERY | Age: 41
End: 2021-02-04
Payer: MEDICAID

## 2021-02-04 VITALS — HEIGHT: 71 IN | WEIGHT: 265 LBS | BODY MASS INDEX: 37.1 KG/M2

## 2021-02-04 DIAGNOSIS — M65.4 DE QUERVAIN'S DISEASE (RADIAL STYLOID TENOSYNOVITIS): ICD-10-CM

## 2021-02-04 DIAGNOSIS — G56.03 BILATERAL CARPAL TUNNEL SYNDROME: Primary | ICD-10-CM

## 2021-02-04 PROCEDURE — G8484 FLU IMMUNIZE NO ADMIN: HCPCS | Performed by: ORTHOPAEDIC SURGERY

## 2021-02-04 PROCEDURE — 20550 NJX 1 TENDON SHEATH/LIGAMENT: CPT | Performed by: ORTHOPAEDIC SURGERY

## 2021-02-04 PROCEDURE — G8419 CALC BMI OUT NRM PARAM NOF/U: HCPCS | Performed by: ORTHOPAEDIC SURGERY

## 2021-02-04 PROCEDURE — 4004F PT TOBACCO SCREEN RCVD TLK: CPT | Performed by: ORTHOPAEDIC SURGERY

## 2021-02-04 PROCEDURE — 99203 OFFICE O/P NEW LOW 30 MIN: CPT | Performed by: ORTHOPAEDIC SURGERY

## 2021-02-04 PROCEDURE — G8427 DOCREV CUR MEDS BY ELIG CLIN: HCPCS | Performed by: ORTHOPAEDIC SURGERY

## 2021-02-04 RX ORDER — LIDOCAINE HYDROCHLORIDE 10 MG/ML
2 INJECTION, SOLUTION INFILTRATION; PERINEURAL ONCE
Status: COMPLETED | OUTPATIENT
Start: 2021-02-04 | End: 2021-02-04

## 2021-02-04 RX ORDER — LIDOCAINE HYDROCHLORIDE 10 MG/ML
2 INJECTION, SOLUTION EPIDURAL; INFILTRATION; INTRACAUDAL; PERINEURAL ONCE
Status: CANCELLED | OUTPATIENT
Start: 2021-02-04 | End: 2021-02-04

## 2021-02-04 RX ORDER — BETAMETHASONE SODIUM PHOSPHATE AND BETAMETHASONE ACETATE 3; 3 MG/ML; MG/ML
6 INJECTION, SUSPENSION INTRA-ARTICULAR; INTRALESIONAL; INTRAMUSCULAR; SOFT TISSUE ONCE
Status: COMPLETED | OUTPATIENT
Start: 2021-02-04 | End: 2021-02-04

## 2021-02-04 RX ADMIN — BETAMETHASONE SODIUM PHOSPHATE AND BETAMETHASONE ACETATE 6 MG: 3; 3 INJECTION, SUSPENSION INTRA-ARTICULAR; INTRALESIONAL; INTRAMUSCULAR; SOFT TISSUE at 15:05

## 2021-02-04 RX ADMIN — LIDOCAINE HYDROCHLORIDE 1 ML: 10 INJECTION, SOLUTION INFILTRATION; PERINEURAL at 14:55

## 2021-02-04 NOTE — PROGRESS NOTES
Patient ID: Dilip Cai is a 41 y.o. female. No chief complaint on file. HPI     Patient presents with 2 issues    1. Chronic bilateral upper extremity carpal tunnel syndrome. Patient reports that she has previously been treated by Dr. Marianne Osgood for this. Patient presents with right thumb pain that shoots distally this is located over the first dorsal compartment and distal.    Patient with difficulty bringing her thumb across her palm to her small finger    Past Medical History:   Diagnosis Date    GERD (gastroesophageal reflux disease) 02/2020    ON RX    Hernia, ventral     Ventral hernia 2018    repaired x 3 with mesh, has another hernia as of 2020    Wears dentures     UPPER AND LOWER BUT DOES NOT WEAR    Wears glasses      Past Surgical History:   Procedure Laterality Date    CHOLECYSTECTOMY, LAPAROSCOPIC N/A 2/10/2020    LAPAROSCOPIC CHOLECYSTECTOMY performed by Joleen Nyhan, DO at [address]  5[address]    UMBILICAL WITH MESH    HERNIA REPAIR  2941    UMBILICAL  WITH MESH    HERNIA REPAIR N/A 5/20/2020    XI ROBOTIC [address] [facility], LYSIS OF ADHESIONS performed by Joleen Nyhan, DO at [address]  05/20/2020    ROBOTIC [address], LYSIS OF ADHESIONS     TUBAL LIGATION  2005     Family History   Problem Relation Age of Onset    Cancer Maternal Grandmother         LUNG    Cancer Maternal Grandfather         LUNG    Cancer Paternal Grandmother         ?  BRAIN    Cancer Paternal Grandfather         THROAT     Social History     Occupational History    Not on file   Tobacco Use    Smoking status: Current Every Day Smoker     Packs/day: 1.00     Years: 25.00     Pack years: 25.00     Types: Cigarettes    Smokeless tobacco: Never Used   Substance and Sexual Activity    Alcohol use: Yes     Comment: BEER-1 OR 2 A MONTH    Drug use: Never    Sexual activity: Yes     Partners: Male        Review of Systems   All other systems reviewed and are negative. Physical Exam  Vitals signs and nursing note reviewed. Constitutional:       Appearance: She is well-developed. HENT:      Head: Normocephalic and atraumatic. Nose: Nose normal.   Eyes:      Conjunctiva/sclera: Conjunctivae normal.   Neck:      Musculoskeletal: Normal range of motion and neck supple. Pulmonary:      Effort: Pulmonary effort is normal. No respiratory distress. Musculoskeletal:      Comments: Normal gait     Skin:     General: Skin is warm and dry. Neurological:      Mental Status: She is alert and oriented to person, place, and time. Sensory: No sensory deficit. Psychiatric:         Behavior: Behavior normal.         Thought Content: Thought content normal.         Patient with significant tenderness over first dorsal compartment pain with palmar abduction of thumb positive Finkelstein    Bilateral upper extremities with positive Phalen's at about 20 seconds    Assessment:          1. Bilateral carpal tunnel syndrome    2. De Quervain's disease (radial styloid tenosynovitis)            Plan:     Inject right first dorsal compartment    EMGs upper extremities    Follow-up      An informed verbal consent for the procedure was obtained and risks including, but not limited to: allergy to medications, injection, bleeding, stiffness of joint, recurrence of symptoms, loss of function, swelling, drainage, irrigation, need for surgery and pseudo-septic inflammation, were explained to the patient. Also, discussed was the potential for further injections, irrigation and debridement and surgery. Alternate means of treatment have also been discussed with the patient.     Administrations This Visit     lidocaine 1 % injection 2 mL     Admin Date  02/04/2021  14:55 Action  Given Dose  1 mL Route  Intra-articular Site  Wrist right Administered By  Ruth Lizama Deyvi Bentley ATC    Ordering Provider: Santy Glaser MD    NDC: 5786-6053-00    Lot#: [id number]    : [facility]    Patient Supplied?: No                No orders of the defined types were placed in this encounter. Santy Glaser MD    Please note that this chart was generated using voicerecognition Dragon dictation software. Although every effort was made to ensurethe accuracy of this automated transcription, some errors in transcription may haveoccurred.

## 2021-02-05 ENCOUNTER — TELEPHONE (OUTPATIENT)
Dept: ORTHOPEDIC SURGERY | Age: 41
End: 2021-02-05

## 2021-02-05 ENCOUNTER — TELEPHONE (OUTPATIENT)
Dept: PHYSICAL MEDICINE AND REHAB | Age: 41
End: 2021-02-05

## 2021-02-05 NOTE — TELEPHONE ENCOUNTER
Patient dropped off signed Authorization for [employer], as well as job description. These were scanned into chart.

## 2021-02-05 NOTE — TELEPHONE ENCOUNTER
Called patient to request a signed authorization for release of medical records in order to complete her accommodations request paperwork to fax to her employer. Patient checked her purse and states she still has this form. Also requested a job description from patient. She states she'll print one and bring both to the office today, 2/5/2021.

## 2021-02-05 NOTE — TELEPHONE ENCOUNTER
Called patient again to provide contact information for Dr. Lexus Hickey, a Neuromuscular Medicine doctor at [address] [place] [address], so patient has an option for scheduling her ordered EMG. [id number] Leyda Lee MD  [address]  [place], [address] [place] [address] [place]  [phone]    Patient will call this office back to inform of her EMG appointment so a follow-up appointment with Dr. ANDRE[facility] may be scheduled.

## 2021-02-11 ENCOUNTER — HOSPITAL ENCOUNTER (OUTPATIENT)
Dept: NEUROLOGY | Age: 41
Discharge: HOME | End: 2021-02-11
Payer: MEDICAID

## 2021-02-11 DIAGNOSIS — M65.4 DE QUERVAIN'S DISEASE (RADIAL STYLOID TENOSYNOVITIS): ICD-10-CM

## 2021-02-11 PROCEDURE — 95886 MUSC TEST DONE W/N TEST COMP: CPT | Performed by: PHYSICAL MEDICINE & REHABILITATION

## 2021-02-11 PROCEDURE — 95885 MUSC TST DONE W/NERV TST LIM: CPT | Performed by: PHYSICAL MEDICINE & REHABILITATION

## 2021-02-11 PROCEDURE — 95909 NRV CNDJ TST 5-6 STUDIES: CPT | Performed by: PHYSICAL MEDICINE & REHABILITATION

## 2021-02-23 ENCOUNTER — OFFICE VISIT (OUTPATIENT)
Dept: ORTHOPEDIC SURGERY | Age: 41
End: 2021-02-23
Payer: MEDICAID

## 2021-02-23 VITALS — TEMPERATURE: 97.6 F

## 2021-02-23 DIAGNOSIS — G56.02 CARPAL TUNNEL SYNDROME OF LEFT WRIST: Primary | ICD-10-CM

## 2021-02-23 PROCEDURE — G8427 DOCREV CUR MEDS BY ELIG CLIN: HCPCS | Performed by: ORTHOPAEDIC SURGERY

## 2021-02-23 PROCEDURE — 99213 OFFICE O/P EST LOW 20 MIN: CPT | Performed by: ORTHOPAEDIC SURGERY

## 2021-02-23 PROCEDURE — G8484 FLU IMMUNIZE NO ADMIN: HCPCS | Performed by: ORTHOPAEDIC SURGERY

## 2021-02-23 PROCEDURE — G8417 CALC BMI ABV UP PARAM F/U: HCPCS | Performed by: ORTHOPAEDIC SURGERY

## 2021-02-23 PROCEDURE — 4004F PT TOBACCO SCREEN RCVD TLK: CPT | Performed by: ORTHOPAEDIC SURGERY

## 2021-02-23 NOTE — PROGRESS NOTES
Patient ID: Alanda Gottron is a 41 y.o. female. Chief Complaint   Patient presents with    Follow-up     EMG review         HPI     Please refer to my previous clinic note as well as note from Dr. Divya Tolbret. Patient here for follow-up de Quervain's as well as carpal tunnel syndrome. Milla Jose C is completely resolved post injection patient remains symptomatic from her carpal tunnel syndrome    Past Medical History:   Diagnosis Date    GERD (gastroesophageal reflux disease) 02/2020    ON RX    Hernia, ventral     Ventral hernia 2018    repaired x 3 with mesh, has another hernia as of 2020    Wears dentures     UPPER AND LOWER BUT DOES NOT WEAR    Wears glasses      Past Surgical History:   Procedure Laterality Date    CHOLECYSTECTOMY, LAPAROSCOPIC N/A 2/10/2020    LAPAROSCOPIC CHOLECYSTECTOMY performed by Luna Arizmendi DO at [address]  [address]    UMBILICAL WITH MESH    HERNIA REPAIR  0596    UMBILICAL  WITH MESH    HERNIA REPAIR N/A 5/20/2020    XI ROBOTIC LAPAROSCOPIC [address] [place], LYSIS OF ADHESIONS performed by Luna Arizmendi DO at [address]  05/20/2020    ROBOTIC [address], LYSIS OF ADHESIONS     TUBAL LIGATION  2005     Family History   Problem Relation Age of Onset    Cancer Maternal Grandmother         LUNG    Cancer Maternal Grandfather         LUNG    Cancer Paternal Grandmother         ?  BRAIN    Cancer Paternal Grandfather         THROAT     Social History     Occupational History    Not on file   Tobacco Use    Smoking status: Current Every Day Smoker     Packs/day: 1.00     Years: 25.00     Pack years: 25.00     Types: Cigarettes    Smokeless tobacco: Never Used   Substance and Sexual Activity    Alcohol use: Yes     Comment: BEER-1 OR 2 A MONTH    Drug use: Never    Sexual activity: Yes     Partners: Male        Review of Systems   All other systems reviewed and are negative. Physical Exam  Vitals signs and nursing note reviewed. Constitutional:       Appearance: She is well-developed. HENT:      Head: Normocephalic and atraumatic. Nose: Nose normal.   Eyes:      Conjunctiva/sclera: Conjunctivae normal.   Neck:      Musculoskeletal: Normal range of motion and neck supple. Pulmonary:      Effort: Pulmonary effort is normal. No respiratory distress. Musculoskeletal:      Comments: Normal gait     Skin:     General: Skin is warm and dry. Neurological:      Mental Status: She is alert and oriented to person, place, and time. Sensory: No sensory deficit. Psychiatric:         Behavior: Behavior normal.         Thought Content: Thought content normal.       EMGs's are reviewed consistent with mild left carpal tunnel syndrome    Dr. Luis Goss history is significant for more severe carpal tunnel syndrome as well as his physical exam which correspond to my  Assessment:          1. Carpal tunnel syndrome of left wrist      Resolved de Quervain's tenosynovitis  Plan:     Okay to schedule left carpal tunnel release    No orders of the defined types were placed in this encounter. Cecy Braswell MD    Please note that this chart was generated using voicerecognition Dragon dictation software. Although every effort was made to ensurethe accuracy of this automated transcription, some errors in transcription may haveoccurred.

## 2021-03-03 ENCOUNTER — HOSPITAL ENCOUNTER (OUTPATIENT)
Dept: PREADMISSION TESTING | Age: 41
Discharge: HOME | End: 2021-03-07
Payer: MEDICAID

## 2021-03-03 VITALS
WEIGHT: 265 LBS | BODY MASS INDEX: 37.1 KG/M2 | OXYGEN SATURATION: 99 % | RESPIRATION RATE: 18 BRPM | DIASTOLIC BLOOD PRESSURE: 79 MMHG | HEART RATE: 97 BPM | TEMPERATURE: 97.3 F | SYSTOLIC BLOOD PRESSURE: 113 MMHG | HEIGHT: 71 IN

## 2021-03-03 LAB
ABSOLUTE EOS #: 0.4 K/UL (ref 0–0.4)
ABSOLUTE IMMATURE GRANULOCYTE: NORMAL K/UL (ref 0–0.3)
ABSOLUTE LYMPH #: 2.7 K/UL (ref 1–4.8)
ABSOLUTE MONO #: 0.6 K/UL (ref 0.1–1.3)
BASOPHILS # BLD: 1 % (ref 0–2)
BASOPHILS ABSOLUTE: 0.1 K/UL (ref 0–0.2)
DIFFERENTIAL TYPE: NORMAL
EOSINOPHILS RELATIVE PERCENT: 4 % (ref 0–4)
HCT VFR BLD CALC: 40.7 % (ref 36–46)
HEMOGLOBIN: 14.1 G/DL (ref 12–16)
IMMATURE GRANULOCYTES: NORMAL %
LYMPHOCYTES # BLD: 30 % (ref 24–44)
MCH RBC QN AUTO: 31.8 PG (ref 26–34)
MCHC RBC AUTO-ENTMCNC: 34.8 G/DL (ref 31–37)
MCV RBC AUTO: 91.5 FL (ref 80–100)
MONOCYTES # BLD: 7 % (ref 1–7)
NRBC AUTOMATED: NORMAL PER 100 WBC
PDW BLD-RTO: 13.6 % (ref 11.5–14.9)
PLATELET # BLD: 288 K/UL (ref 150–450)
PLATELET ESTIMATE: NORMAL
PMV BLD AUTO: 8.4 FL (ref 6–12)
RBC # BLD: 4.45 M/UL (ref 4–5.2)
RBC # BLD: NORMAL 10*6/UL
SEG NEUTROPHILS: 58 % (ref 36–66)
SEGMENTED NEUTROPHILS ABSOLUTE COUNT: 5.3 K/UL (ref 1.3–9.1)
WBC # BLD: 9.1 K/UL (ref 3.5–11)
WBC # BLD: NORMAL 10*3/UL

## 2021-03-03 PROCEDURE — 36415 COLL VENOUS BLD VENIPUNCTURE: CPT

## 2021-03-03 PROCEDURE — 85025 COMPLETE CBC W/AUTO DIFF WBC: CPT

## 2021-03-03 RX ORDER — BUPROPION HYDROCHLORIDE 150 MG/1
150 TABLET ORAL EVERY MORNING
COMMUNITY

## 2021-03-03 NOTE — H&P
HISTORY and Ede [facility] [address]       NAME:  Yvette Christie  MRN: [id number]   YOB: 1980   Date: 3/3/2021   Age: 41 y.o. Gender: female       COMPLAINT AND PRESENT HISTORY:     Yvette Christie is 41 y.o.,  female, undergoing preadmission testing for Carpal Tunnel Syndrome in left wrist.   Patient will be having Left Carpal Tunnel Release. Patient also has Tendinitis left thumb  Patient has onset of sxs since 2019, patient states that things have worsen since the  beginning of January 2021. Patient had EMG/Nerve conduction test that showed mild results. Pt C/O of wrist pain that radiates to the hand and distal forearm. Patient also has weakness (dropping objects), numbness and tingling in the thumb index and middle fingers. Patient has more nocturnal discomfort. Patient describes her pain as aching and burning. Patient present with greater than hx of numbness and tingling. Pt denies any  problems or  surgery on the other wrist.  Pt has been using a brace and had local steroid injections with  improvement of the symptoms. Patient states that her former jobs required using her hands constantly and heavy lifting. Past  medical history: hernias and GERD. blood thinners:   Motrin but has stopped  No nausea, vomiting or diarrhea. No fever or chills.        PAST MEDICAL HISTORY     Past Medical History:   Diagnosis Date    Anxiety     Depression     GERD (gastroesophageal reflux disease) 02/2020    ON RX    Hernia, ventral     Ventral hernia 2018    repaired x 3 with mesh, has another hernia as of 2020    Wears dentures     UPPER AND LOWER BUT DOES NOT WEAR    Wears glasses          SURGICAL HISTORY       Past Surgical History:   Procedure Laterality Date    CHOLECYSTECTOMY, LAPAROSCOPIC N/A 2/10/2020    LAPAROSCOPIC CHOLECYSTECTOMY performed by Patel Johnson DO at [facility]      at age 7   [id number] [address] REPAIR  [id number]    UMBILICAL WITH MESH    HERNIA REPAIR  0515    UMBILICAL  WITH MESH    HERNIA REPAIR N/A 5/20/2020    XI ROBOTIC LAPAROSCOPIC INCISIONAL HERNIA REPAIR WITH MESH, LYSIS OF ADHESIONS performed by Dalton Lee DO at [address]  05/20/2020    ROBOTIC LAPAROSCOPIC INCISIONAL HERNIA REPAIR WITH MESH, LYSIS OF ADHESIONS     TONSILLECTOMY      TOOTH EXTRACTION      all teeth including wisdom    TUBAL LIGATION  2005       FAMILY HISTORY       Family History   Problem Relation Age of Onset    Cancer Maternal Grandmother         LUNG    Cancer Maternal Grandfather         LUNG    Cancer Paternal Grandmother         ?  BRAIN    Cancer Paternal Grandfather         THROAT       SOCIAL HISTORY       Social History     Socioeconomic History    Marital status:      Spouse name: None    Number of children: None    Years of education: None    Highest education level: None   Occupational History    None   Social Needs    Financial resource strain: None    Food insecurity     Worry: None     Inability: None    Transportation needs     Medical: None     Non-medical: None   Tobacco Use    Smoking status: Current Every Day Smoker     Packs/day: 1.00     Years: 25.00     Pack years: 25.00     Types: Cigarettes    Smokeless tobacco: Never Used   Substance and Sexual Activity    Alcohol use: Yes     Comment: BEER-1 OR 2 A MONTH    Drug use: Never    Sexual activity: Yes     Partners: Male   Lifestyle    Physical activity     Days per week: None     Minutes per session: None    Stress: None   Relationships    Social connections     Talks on phone: None     Gets together: None     Attends Islam service: None     Active member of club or organization: None     Attends meetings of clubs or organizations: None     Relationship status: None    Intimate partner violence     Fear of current or ex partner: None     Emotionally abused: None     Physically abused: None Forced sexual activity: None   Other Topics Concern    None   Social History Narrative    None           REVIEW OF SYSTEMS      Allergies   Allergen Reactions    Seasonal        Current Outpatient Medications on File Prior to Encounter   Medication Sig Dispense Refill    buPROPion (WELLBUTRIN XL) 150 MG extended release tablet Take 150 mg by mouth every morning       No current facility-administered medications on file prior to encounter. General health:  Fairly good. No fever or chills. Skin:  No itching, redness or rash. HEENT:  No headache, epistaxis or sore throat. Neck:  No pain, stiffness or masses. Cardiovascular/Respiratory system:  No chest pain, palpitation or shortness of breath. Gastrointestinal tract: No abdominal pain, Dysphagia, nausea, vomiting, diarrhea or constipation. Genitourinary:  No burning on micturition. No hesitancy, urgency, frequency or discoloration of urine. Locomotor: See HPI. Neuropsychiatric:  No referable complaints. GENERAL PHYSICAL EXAM:     Vitals: /79   Pulse 97   Temp 97.3 °F (36.3 °C) (Infrared)   Resp 18   Ht 5' 11\" (1.803 m)   Wt 265 lb (120.2 kg)   LMP 03/03/2021   SpO2 99%   BMI 36.96 kg/m²  Body mass index is 36.96 kg/m². GENERAL APPEARANCE:   Micheal Salcedo is 41 y.o.,  female, moderately obese, nourished, conscious, alert. Does not appear to be distress or pain at this time. SKIN:  Warm, dry, no cyanosis or jaundice. HEAD:  Normocephalic, atraumatic, no swelling or tenderness. EYES:  Pupils equal, reactive to light. EARS:  No discharge, no marked hearing loss. NOSE:  No rhinorrhea, epistaxis or septal deformity. THROAT:  Not congested. No ulceration bleeding or discharge.

## 2021-03-03 NOTE — H&P (VIEW-ONLY)
HISTORY and Treinta FRANCOISE [facility]       NAME:  Alvaro Graves  MRN: [id number]   YOB: 1980   Date: 3/3/2021   Age: 41 y.o. Gender: female       COMPLAINT AND PRESENT HISTORY:     Alvaro Graves is 41 y.o.,  female, undergoing preadmission testing for Carpal Tunnel Syndrome in left wrist.   Patient will be having Left Carpal Tunnel Release. Patient also has Tendinitis left thumb  Patient has onset of sxs since 2019, patient states that things have worsen since the  beginning of January 2021. Patient had EMG/Nerve conduction test that showed mild results. Pt C/O of wrist pain that radiates to the hand and distal forearm. Patient also has weakness (dropping objects), numbness and tingling in the thumb index and middle fingers. Patient has more nocturnal discomfort. Patient describes her pain as aching and burning. Patient present with greater than hx of numbness and tingling. Pt denies any  problems or  surgery on the other wrist.  Pt has been using a brace and had local steroid injections with  improvement of the symptoms. Patient states that her former jobs required using her hands constantly and heavy lifting. Past  medical history: hernias and GERD. blood thinners:   Motrin but has stopped  No nausea, vomiting or diarrhea. No fever or chills.        PAST MEDICAL HISTORY     Past Medical History:   Diagnosis Date    Anxiety     Depression     GERD (gastroesophageal reflux disease) 02/2020    ON RX    Hernia, ventral     Ventral hernia 2018    repaired x 3 with mesh, has another hernia as of 2020    Wears dentures     UPPER AND LOWER BUT DOES NOT WEAR    Wears glasses          SURGICAL HISTORY       Past Surgical History:   Procedure Laterality Date    CHOLECYSTECTOMY, LAPAROSCOPIC N/A 2/10/2020    LAPAROSCOPIC CHOLECYSTECTOMY performed by Amara Colin DO at [facility]      at age 7   [id number] [address] REPAIR  [id number]    UMBILICAL WITH MESH    HERNIA REPAIR  6901    UMBILICAL  WITH MESH    HERNIA REPAIR N/A 5/20/2020    XI ROBOTIC LAPAROSCOPIC INCISIONAL HERNIA REPAIR WITH MESH, LYSIS OF ADHESIONS performed by Orquidea Zepeda DO at [address]  05/20/2020    ROBOTIC LAPAROSCOPIC INCISIONAL HERNIA REPAIR WITH MESH, LYSIS OF ADHESIONS     TONSILLECTOMY      TOOTH EXTRACTION      all teeth including wisdom    TUBAL LIGATION  2005       FAMILY HISTORY       Family History   Problem Relation Age of Onset    Cancer Maternal Grandmother         LUNG    Cancer Maternal Grandfather         LUNG    Cancer Paternal Grandmother         ?  BRAIN    Cancer Paternal Grandfather         THROAT       SOCIAL HISTORY       Social History     Socioeconomic History    Marital status:      Spouse name: None    Number of children: None    Years of education: None    Highest education level: None   Occupational History    None   Social Needs    Financial resource strain: None    Food insecurity     Worry: None     Inability: None    Transportation needs     Medical: None     Non-medical: None   Tobacco Use    Smoking status: Current Every Day Smoker     Packs/day: 1.00     Years: 25.00     Pack years: 25.00     Types: Cigarettes    Smokeless tobacco: Never Used   Substance and Sexual Activity    Alcohol use: Yes     Comment: BEER-1 OR 2 A MONTH    Drug use: Never    Sexual activity: Yes     Partners: Male   Lifestyle    Physical activity     Days per week: None     Minutes per session: None    Stress: None   Relationships    Social connections     Talks on phone: None     Gets together: None     Attends Restorationist service: None     Active member of club or organization: None     Attends meetings of clubs or organizations: None     Relationship status: None    Intimate partner violence     Fear of current or ex partner: None     Emotionally abused: None     Physically abused: None Forced sexual activity: None   Other Topics Concern    None   Social History Narrative    None           REVIEW OF SYSTEMS      Allergies   Allergen Reactions    Seasonal        Current Outpatient Medications on File Prior to Encounter   Medication Sig Dispense Refill    buPROPion (WELLBUTRIN XL) 150 MG extended release tablet Take 150 mg by mouth every morning       No current facility-administered medications on file prior to encounter. General health:  Fairly good. No fever or chills. Skin:  No itching, redness or rash. HEENT:  No headache, epistaxis or sore throat. Neck:  No pain, stiffness or masses. Cardiovascular/Respiratory system:  No chest pain, palpitation or shortness of breath. Gastrointestinal tract: No abdominal pain, Dysphagia, nausea, vomiting, diarrhea or constipation. Genitourinary:  No burning on micturition. No hesitancy, urgency, frequency or discoloration of urine. Locomotor: See HPI. Neuropsychiatric:  No referable complaints. GENERAL PHYSICAL EXAM:     Vitals: /79   Pulse 97   Temp 97.3 °F (36.3 °C) (Infrared)   Resp 18   Ht 5' 11\" (1.803 m)   Wt 265 lb (120.2 kg)   LMP 03/03/2021   SpO2 99%   BMI 36.96 kg/m²  Body mass index is 36.96 kg/m². GENERAL APPEARANCE:   Clark Le is 41 y.o.,  female, moderately obese, nourished, conscious, alert. Does not appear to be distress or pain at this time. SKIN:  Warm, dry, no cyanosis or jaundice. HEAD:  Normocephalic, atraumatic, no swelling or tenderness. EYES:  Pupils equal, reactive to light. EARS:  No discharge, no marked hearing loss. NOSE:  No rhinorrhea, epistaxis or septal deformity. THROAT:  Not congested. No ulceration bleeding or discharge. NECK:  No stiffness, trachea central.                  CHEST:  Symmetrical and equal on expansion. HEART:  RRR S1 > S2. No audible murmurs or gallops. LUNGS:  Equal on expansion, normal breath sounds. No adventitious sounds. ABDOMEN:  Obese. Soft on palpation. No localized tenderness. No guarding or rigidity. No palpable hepatosplenomegaly. LYMPHATICS:  No palpable cervical lymphadenopathy. LOCOMOTOR, BACK AND SPINE:  No tenderness or deformities. EXTREMITIES:  Symmetrical, no pretibial edema. Rolandos sign negative or no calf tenderness. No discoloration or ulcerations. Positive Phalens Test and Tinels Sign on the left wrist.    NEUROLOGIC:  The patient is conscious, alert, oriented,Cranial nerve II-XII intact, taste and smell were not examined. No apparent focal sensory or motor deficits.                                                                                      PROVISIONAL DIAGNOSES / SURGERY:      CARPAL TUNNEL SYNDROME, LEFT    CARPAL TUNNEL RELEASE Left      Patient Active Problem List    Diagnosis Date Noted    Recurrent incisional hernia with incarceration 05/20/2020    Morbid obesity ([address] 05/20/2020    Recurrent incisional hernia 05/20/2020    Cholelithiasis and cholecystitis without obstruction 02/10/2020           ISABEL Dejesus, APRN - CNP on 3/3/2021 at 12:35 PM

## 2021-03-13 ENCOUNTER — HOSPITAL ENCOUNTER (OUTPATIENT)
Age: 41
Setting detail: SPECIMEN
Discharge: HOME | End: 2021-03-13
Payer: MEDICAID

## 2021-03-13 ENCOUNTER — HOSPITAL ENCOUNTER (OUTPATIENT)
Dept: LAB | Age: 41
Setting detail: SPECIMEN
Discharge: HOME | End: 2021-03-13
Payer: MEDICAID

## 2021-03-13 DIAGNOSIS — Z01.818 PRE-OP TESTING: Primary | ICD-10-CM

## 2021-03-13 DIAGNOSIS — Z01.818 PRE-OP TESTING: ICD-10-CM

## 2021-03-15 LAB
SARS-COV-2: NORMAL
SARS-COV-2: NOT DETECTED
SOURCE: NORMAL

## 2021-03-16 ENCOUNTER — ANESTHESIA EVENT (OUTPATIENT)
Dept: OPERATING ROOM | Age: 41
End: 2021-03-16
Payer: MEDICAID

## 2021-03-16 ENCOUNTER — TELEPHONE (OUTPATIENT)
Dept: PRIMARY CARE CLINIC | Age: 41
End: 2021-03-16

## 2021-03-17 ENCOUNTER — ANESTHESIA (OUTPATIENT)
Dept: OPERATING ROOM | Age: 41
End: 2021-03-17
Payer: MEDICAID

## 2021-03-17 ENCOUNTER — HOSPITAL ENCOUNTER (OUTPATIENT)
Age: 41
Setting detail: OUTPATIENT SURGERY
Discharge: HOME | End: 2021-03-17
Attending: ORTHOPAEDIC SURGERY | Admitting: ORTHOPAEDIC SURGERY
Payer: MEDICAID

## 2021-03-17 VITALS — OXYGEN SATURATION: 98 % | SYSTOLIC BLOOD PRESSURE: 94 MMHG | DIASTOLIC BLOOD PRESSURE: 55 MMHG | TEMPERATURE: 96.4 F

## 2021-03-17 VITALS
BODY MASS INDEX: 37.1 KG/M2 | RESPIRATION RATE: 14 BRPM | SYSTOLIC BLOOD PRESSURE: 108 MMHG | TEMPERATURE: 96.4 F | HEART RATE: 78 BPM | DIASTOLIC BLOOD PRESSURE: 62 MMHG | OXYGEN SATURATION: 95 % | HEIGHT: 71 IN | WEIGHT: 265 LBS

## 2021-03-17 DIAGNOSIS — G56.02 LEFT CARPAL TUNNEL SYNDROME: Primary | ICD-10-CM

## 2021-03-17 LAB
-: NORMAL
HCG, PREGNANCY URINE (POC): NEGATIVE

## 2021-03-17 PROCEDURE — 3700000000 HC ANESTHESIA ATTENDED CARE: Performed by: ORTHOPAEDIC SURGERY

## 2021-03-17 PROCEDURE — 3600000012 HC SURGERY LEVEL 2 ADDTL 15MIN: Performed by: ORTHOPAEDIC SURGERY

## 2021-03-17 PROCEDURE — 7100000001 HC PACU RECOVERY - ADDTL 15 MIN: Performed by: ORTHOPAEDIC SURGERY

## 2021-03-17 PROCEDURE — 7100000030 HC ASPR PHASE II RECOVERY - FIRST 15 MIN: Performed by: ORTHOPAEDIC SURGERY

## 2021-03-17 PROCEDURE — 81025 URINE PREGNANCY TEST: CPT

## 2021-03-17 PROCEDURE — 2709999900 HC NON-CHARGEABLE SUPPLY: Performed by: ORTHOPAEDIC SURGERY

## 2021-03-17 PROCEDURE — 2500000003 HC RX 250 WO HCPCS: Performed by: ORTHOPAEDIC SURGERY

## 2021-03-17 PROCEDURE — 2500000003 HC RX 250 WO HCPCS: Performed by: NURSE ANESTHETIST, CERTIFIED REGISTERED

## 2021-03-17 PROCEDURE — 6360000002 HC RX W HCPCS: Performed by: NURSE ANESTHETIST, CERTIFIED REGISTERED

## 2021-03-17 PROCEDURE — 7100000000 HC PACU RECOVERY - FIRST 15 MIN: Performed by: ORTHOPAEDIC SURGERY

## 2021-03-17 PROCEDURE — 3700000001 HC ADD 15 MINUTES (ANESTHESIA): Performed by: ORTHOPAEDIC SURGERY

## 2021-03-17 PROCEDURE — 3600000002 HC SURGERY LEVEL 2 BASE: Performed by: ORTHOPAEDIC SURGERY

## 2021-03-17 PROCEDURE — 7100000031 HC ASPR PHASE II RECOVERY - ADDTL 15 MIN: Performed by: ORTHOPAEDIC SURGERY

## 2021-03-17 PROCEDURE — 6360000002 HC RX W HCPCS: Performed by: ORTHOPAEDIC SURGERY

## 2021-03-17 PROCEDURE — 7100000010 HC PHASE II RECOVERY - FIRST 15 MIN: Performed by: ORTHOPAEDIC SURGERY

## 2021-03-17 PROCEDURE — 2580000003 HC RX 258: Performed by: ANESTHESIOLOGY

## 2021-03-17 PROCEDURE — 7100000011 HC PHASE II RECOVERY - ADDTL 15 MIN: Performed by: ORTHOPAEDIC SURGERY

## 2021-03-17 RX ORDER — FENTANYL CITRATE 50 UG/ML
INJECTION, SOLUTION INTRAMUSCULAR; INTRAVENOUS PRN
Status: DISCONTINUED | OUTPATIENT
Start: 2021-03-17 | End: 2021-03-17 | Stop reason: SDUPTHER

## 2021-03-17 RX ORDER — ONDANSETRON 2 MG/ML
INJECTION INTRAMUSCULAR; INTRAVENOUS PRN
Status: DISCONTINUED | OUTPATIENT
Start: 2021-03-17 | End: 2021-03-17 | Stop reason: SDUPTHER

## 2021-03-17 RX ORDER — SODIUM CHLORIDE, SODIUM LACTATE, POTASSIUM CHLORIDE, CALCIUM CHLORIDE 600; 310; 30; 20 MG/100ML; MG/100ML; MG/100ML; MG/100ML
INJECTION, SOLUTION INTRAVENOUS CONTINUOUS
Status: DISCONTINUED | OUTPATIENT
Start: 2021-03-17 | End: 2021-03-17 | Stop reason: HOSPADM

## 2021-03-17 RX ORDER — PROPOFOL 10 MG/ML
INJECTION, EMULSION INTRAVENOUS PRN
Status: DISCONTINUED | OUTPATIENT
Start: 2021-03-17 | End: 2021-03-17 | Stop reason: SDUPTHER

## 2021-03-17 RX ORDER — LIDOCAINE HYDROCHLORIDE 20 MG/ML
INJECTION, SOLUTION EPIDURAL; INFILTRATION; INTRACAUDAL; PERINEURAL PRN
Status: DISCONTINUED | OUTPATIENT
Start: 2021-03-17 | End: 2021-03-17 | Stop reason: SDUPTHER

## 2021-03-17 RX ORDER — SODIUM CHLORIDE 0.9 % (FLUSH) 0.9 %
10 SYRINGE (ML) INJECTION EVERY 12 HOURS SCHEDULED
Status: DISCONTINUED | OUTPATIENT
Start: 2021-03-17 | End: 2021-03-17 | Stop reason: HOSPADM

## 2021-03-17 RX ORDER — LIDOCAINE HYDROCHLORIDE AND EPINEPHRINE 10; 10 MG/ML; UG/ML
INJECTION, SOLUTION INFILTRATION; PERINEURAL PRN
Status: DISCONTINUED | OUTPATIENT
Start: 2021-03-17 | End: 2021-03-17 | Stop reason: ALTCHOICE

## 2021-03-17 RX ORDER — OXYCODONE HYDROCHLORIDE AND ACETAMINOPHEN 5; 325 MG/1; MG/1
1 TABLET ORAL PRN
Status: DISCONTINUED | OUTPATIENT
Start: 2021-03-17 | End: 2021-03-17 | Stop reason: HOSPADM

## 2021-03-17 RX ORDER — LABETALOL HYDROCHLORIDE 5 MG/ML
5 INJECTION, SOLUTION INTRAVENOUS EVERY 10 MIN PRN
Status: DISCONTINUED | OUTPATIENT
Start: 2021-03-17 | End: 2021-03-17 | Stop reason: HOSPADM

## 2021-03-17 RX ORDER — LIDOCAINE HYDROCHLORIDE 10 MG/ML
1 INJECTION, SOLUTION EPIDURAL; INFILTRATION; INTRACAUDAL; PERINEURAL
Status: DISCONTINUED | OUTPATIENT
Start: 2021-03-17 | End: 2021-03-17 | Stop reason: HOSPADM

## 2021-03-17 RX ORDER — ONDANSETRON 2 MG/ML
4 INJECTION INTRAMUSCULAR; INTRAVENOUS
Status: DISCONTINUED | OUTPATIENT
Start: 2021-03-17 | End: 2021-03-17 | Stop reason: HOSPADM

## 2021-03-17 RX ORDER — OXYCODONE HYDROCHLORIDE AND ACETAMINOPHEN 5; 325 MG/1; MG/1
2 TABLET ORAL PRN
Status: DISCONTINUED | OUTPATIENT
Start: 2021-03-17 | End: 2021-03-17 | Stop reason: HOSPADM

## 2021-03-17 RX ORDER — DIPHENHYDRAMINE HYDROCHLORIDE 50 MG/ML
12.5 INJECTION INTRAMUSCULAR; INTRAVENOUS
Status: DISCONTINUED | OUTPATIENT
Start: 2021-03-17 | End: 2021-03-17 | Stop reason: HOSPADM

## 2021-03-17 RX ORDER — SODIUM CHLORIDE 0.9 % (FLUSH) 0.9 %
10 SYRINGE (ML) INJECTION PRN
Status: DISCONTINUED | OUTPATIENT
Start: 2021-03-17 | End: 2021-03-17 | Stop reason: HOSPADM

## 2021-03-17 RX ORDER — HYDROCODONE BITARTRATE AND ACETAMINOPHEN 5; 325 MG/1; MG/1
1 TABLET ORAL EVERY 4 HOURS PRN
Qty: 18 TABLET | Refills: 0 | Status: SHIPPED | OUTPATIENT
Start: 2021-03-17 | End: 2021-03-20

## 2021-03-17 RX ORDER — MIDAZOLAM HYDROCHLORIDE 1 MG/ML
INJECTION INTRAMUSCULAR; INTRAVENOUS PRN
Status: DISCONTINUED | OUTPATIENT
Start: 2021-03-17 | End: 2021-03-17 | Stop reason: SDUPTHER

## 2021-03-17 RX ADMIN — MIDAZOLAM 2 MG: 1 INJECTION INTRAMUSCULAR; INTRAVENOUS at 07:29

## 2021-03-17 RX ADMIN — FENTANYL CITRATE 25 MCG: 50 INJECTION, SOLUTION INTRAMUSCULAR; INTRAVENOUS at 07:39

## 2021-03-17 RX ADMIN — SODIUM CHLORIDE, POTASSIUM CHLORIDE, SODIUM LACTATE AND CALCIUM CHLORIDE: 600; 310; 30; 20 INJECTION, SOLUTION INTRAVENOUS at 06:39

## 2021-03-17 RX ADMIN — FENTANYL CITRATE 50 MCG: 50 INJECTION, SOLUTION INTRAMUSCULAR; INTRAVENOUS at 07:32

## 2021-03-17 RX ADMIN — LIDOCAINE HYDROCHLORIDE 80 MG: 20 INJECTION, SOLUTION EPIDURAL; INFILTRATION; INTRACAUDAL; PERINEURAL at 07:32

## 2021-03-17 RX ADMIN — FENTANYL CITRATE 25 MCG: 50 INJECTION, SOLUTION INTRAMUSCULAR; INTRAVENOUS at 07:51

## 2021-03-17 RX ADMIN — PROPOFOL 175 MG: 10 INJECTION, EMULSION INTRAVENOUS at 07:32

## 2021-03-17 RX ADMIN — ONDANSETRON 4 MG: 2 INJECTION INTRAMUSCULAR; INTRAVENOUS at 07:37

## 2021-03-17 RX ADMIN — CEFAZOLIN 2 G: 10 INJECTION, POWDER, FOR SOLUTION INTRAVENOUS at 07:35

## 2021-03-17 ASSESSMENT — PULMONARY FUNCTION TESTS
PIF_VALUE: 22
PIF_VALUE: 12
PIF_VALUE: 10
PIF_VALUE: 11
PIF_VALUE: 1
PIF_VALUE: 0
PIF_VALUE: 11
PIF_VALUE: 1
PIF_VALUE: 0
PIF_VALUE: 11

## 2021-03-17 ASSESSMENT — PAIN SCALES - GENERAL
PAINLEVEL_OUTOF10: 0
PAINLEVEL_OUTOF10: 0

## 2021-03-17 ASSESSMENT — PAIN - FUNCTIONAL ASSESSMENT: PAIN_FUNCTIONAL_ASSESSMENT: 0-10

## 2021-03-17 ASSESSMENT — LIFESTYLE VARIABLES: SMOKING_STATUS: 1

## 2021-03-17 NOTE — ANESTHESIA PRE PROCEDURE
Department of Anesthesiology  Preprocedure Note       Name:  Deepthi Campbell   Age:  41 y.o.  :  1980                                          MRN:  [id number]         Date:  3/17/2021      Surgeon: Edilberto Odom):  Alhaji Parekh MD    Procedure: Procedure(s):  CARPAL TUNNEL RELEASE    Medications prior to admission:   Prior to Admission medications    Medication Sig Start Date End Date Taking? Authorizing Provider   buPROPion (WELLBUTRIN XL) 150 MG extended release tablet Take 150 mg by mouth every morning    Historical Provider, MD       Current medications:    Current Facility-Administered Medications   Medication Dose Route Frequency Provider Last Rate Last Admin    lactated ringers infusion   Intravenous Continuous Michele Baca  mL/hr at 21 0639 New Bag at 21 0639    sodium chloride flush 0.9 % injection 10 mL  10 mL Intravenous 2 times per day Michele Baca MD        sodium chloride flush 0.9 % injection 10 mL  10 mL Intravenous PRN Michele Baca MD        lidocaine PF 1 % injection 1 mL  1 mL Intradermal Once PRN Michele Baca MD           Allergies:     Allergies   Allergen Reactions    Seasonal        Problem List:    Patient Active Problem List   Diagnosis Code    Cholelithiasis and cholecystitis without obstruction K80.10    Recurrent incisional hernia with incarceration K43.0    Morbid [address] ([facility] [address] E66.01    Recurrent incisional hernia K43.2       Past Medical History:        Diagnosis Date    Anxiety     Depression     GERD (gastroesophageal reflux disease) 2020    ON RX    Hernia, ventral     Ventral hernia 2018    repaired x 3 with mesh, has another hernia as of 2020    Wears dentures     UPPER AND LOWER BUT DOES NOT WEAR    Wears glasses        Past Surgical History:        Procedure Laterality Date    CHOLECYSTECTOMY, LAPAROSCOPIC N/A 2/10/2020    LAPAROSCOPIC CHOLECYSTECTOMY performed by Cally Salvador DO at [address]      at age 7    [address] REPAIR  9831    UMBILICAL WITH MESH    HERNIA REPAIR  5349    UMBILICAL WITH MESH    HERNIA REPAIR  5003    UMBILICAL  WITH MESH    HERNIA REPAIR N/A 5/20/2020    XI ROBOTIC LAPAROSCOPIC INCISIONAL HERNIA REPAIR WITH MESH, LYSIS OF ADHESIONS performed by Sobia Kong DO at [address]  05/20/2020    ROBOTIC LAPAROSCOPIC INCISIONAL HERNIA REPAIR WITH MESH, LYSIS OF ADHESIONS     TONSILLECTOMY      TOOTH EXTRACTION      all teeth including wisdom    TUBAL LIGATION  2005       Social History:    Social History     Tobacco Use    Smoking status: Current Every Day Smoker     Packs/day: 1.00     Years: 25.00     Pack years: 25.00     Types: Cigarettes    Smokeless tobacco: Never Used   Substance Use Topics    Alcohol use: Yes     Comment: BEER-1 OR 2 A MONTH                                Ready to quit: Not Answered  Counseling given: Not Answered      Vital Signs (Current):   Vitals:    03/17/21 0623   BP: 107/62   Pulse: 83   Resp: 18   Temp: 96.9 °F (36.1 °C)   TempSrc: Infrared   SpO2: 97%   Weight: 265 lb (120.2 kg)   Height: 5' 11\" (1.803 m)                                              BP Readings from Last 3 Encounters:   03/17/21 107/62   03/03/21 113/79   02/01/21 (!) 146/78       NPO Status: Time of last liquid consumption: 2000                        Time of last solid consumption: 1900                        Date of last liquid consumption: 03/16/21                        Date of last solid food consumption: 03/16/21    BMI:   Wt Readings from Last 3 Encounters:   03/17/21 265 lb (120.2 kg)   03/03/21 265 lb (120.2 kg)   02/04/21 265 lb (120.2 kg)     Body mass index is 36.96 kg/m².     CBC:   Lab Results   Component Value Date    WBC 9.1 03/03/2021    RBC 4.45 03/03/2021    HGB 14.1 03/03/2021    HCT 40.7 03/03/2021    MCV 91.5 03/03/2021    RDW 13.6 03/03/2021     03/03/2021       CMP:   Lab Results   Component Value Date     02/06/2020    K 4.3 02/06/2020  02/06/2020    CO2 22 02/06/2020    BUN 6 02/06/2020    CREATININE 0.65 02/06/2020    GFRAA >60 02/06/2020    LABGLOM >60 02/06/2020    GLUCOSE 122 02/06/2020       POC Tests: No results for input(s): POCGLU, POCNA, POCK, POCCL, POCBUN, POCHEMO, POCHCT in the last 72 hours. Coags: No results found for: PROTIME, INR, APTT    HCG (If Applicable):   Lab Results   Component Value Date    HCG NEGATIVE 03/17/2021        ABGs: No results found for: PHART, PO2ART, TGY0OBG, VBD9FPF, BEART, S5RAXJFB     Type & Screen (If Applicable):  No results found for: LABABO, LABRH    Drug/Infectious Status (If Applicable):  No results found for: HIV, HEPCAB    COVID-19 Screening (If Applicable):   Lab Results   Component Value Date    COVID19 Not Detected 03/13/2021    COVID19 Not Detected 05/17/2020           Anesthesia Evaluation  Patient summary reviewed and Nursing notes reviewed no history of anesthetic complications:   Airway: Mallampati: III  TM distance: >3 FB   Neck ROM: full  Mouth opening: > = 3 FB Dental:    (+) edentulous      Pulmonary:normal exam  breath sounds clear to auscultation  (+) current smoker                           Cardiovascular:            Rhythm: regular  Rate: normal                    Neuro/Psych:   (+) psychiatric history:depression/anxiety             GI/Hepatic/Renal:   (+) GERD:, morbid obesity          Endo/Other:                      ROS comment: Left Carpal Tunnel Syndrome Abdominal:           Vascular:                                      Anesthesia Plan      general     ASA 3       Induction: intravenous. MIPS: Postoperative opioids intended and Prophylactic antiemetics administered. Anesthetic plan and risks discussed with patient. Plan discussed with CRNA.                   Maribel Parker MD   3/17/2021

## 2021-03-17 NOTE — OP NOTE
[address]                 [address] [facility], [address]                                OPERATIVE REPORT    PATIENT NAME: Nany Monte                     :        1980  MED REC NO:   [id number]                              ROOM:  ACCOUNT NO:   [de-identified]                           ADMIT DATE: 2021  PROVIDER:     Denys Boyd    DATE OF PROCEDURE:  2021    PREOPERATIVE DIAGNOSIS:  Left carpal tunnel syndrome. POSTOPERATIVE DIAGNOSIS:  Left carpal tunnel syndrome. PROCEDURE PERFORMED:  Left carpal tunnel release. OPERATING SURGEON:  Denys Boyd MD    ANESTHESIA:  General, local.    FINDINGS:  Transverse carpal ligament transected under direct  visualization. PROCEDURE IN DETAIL:  The patient was taken to the operating room,  placed under LMA general anesthesia. Left nancy-incisional area was  injected with lidocaine with epinephrine. Left upper extremity was prepped and draped in the usual sterile  fashion. Direct anterior incision was made, carried down through skin and  subcutaneous fat. Transverse carpal ligament was identified, transected  under direct visualization. Skin was reapproximated with 3-0 nylon  suture. Sterile dressing was applied. The patient was taken to  recovery room in stable condition. COMPLICATIONS ARISING DURING THE OPERATION:  None noted.         TRISTON Elder    D: 2021 8:04:43       T: 2021 8:13:15     [email or web address]  Job#: [id number]     Doc#: [id number]    CC:

## 2021-03-17 NOTE — BRIEF OP NOTE
Brief Postoperative Note      Patient: Maribell Lynn  YOB: 1980  MRN: [id number]    Date of Procedure: 3/17/2021    Pre-Op Diagnosis: LEFT CARPAL TUNNEL SYNDROME    Post-Op Diagnosis: Same       Procedure(s):  CARPAL TUNNEL RELEASE left    Surgeon(s):  Meagan Moe MD    Assistant:  * No surgical staff found *    Anesthesia: Monitor Anesthesia Care    Estimated Blood Loss (mL): Minimal    Complications: None    Specimens:   * No specimens in log *    Implants:  * No implants in log *      Drains: * No LDAs found *    Findings: see dictation    Electronically signed by Meagan Moe MD on 3/17/2021 at 7:51 AM

## 2021-03-17 NOTE — INTERVAL H&P NOTE
Update History & Physical    The patient's History and Physical of March 3, 2021 was reviewed with the patient and I examined the patient. There was no change. The surgical site was confirmed by the patient and me. Patient has been NPO since midnight. No blood thinners in the past 7 days. Patient denies any issues with anesthesia. Plan: The risks, benefits, expected outcome, and alternative to the recommended procedure have been discussed with the patient. Patient understands and wants to proceed with the procedure.      Electronically signed by CYNDI Avila CNP on 3/17/2021 at 5:31 AM

## 2021-03-17 NOTE — ANESTHESIA POSTPROCEDURE EVALUATION
Department of Anesthesiology  Postprocedure Note    Patient: Anna Guajardo  MRN: [id number]  YOB: 1980  Date of evaluation: 3/17/2021  Time:  9:00 AM     Procedure Summary     Date: 03/17/21 Room / Location: [address] [place] [address] Lucius Oshea 03 / [address]    Anesthesia Start: 5426 Anesthesia Stop: 4625    Procedure: CARPAL TUNNEL RELEASE (Left Hand) Diagnosis: (LEFT CARPAL TUNNEL SYNDROME)    Surgeons: Nj Araiza MD Responsible Provider: Niraj Bland MD    Anesthesia Type: General ASA Status: 3          Anesthesia Type: General    John Phase I: John Score: 10    John Phase II: John Score: 10    Last vitals: Reviewed and per EMR flowsheets.        Anesthesia Post Evaluation    Comments: POST- ANESTHESIA EVALUATION       Pt Name: Anna Guajardo  MRN: [id number]  YOB: 1980  Date of evaluation: 3/17/2021  Time:  9:00 AM      /62   Pulse 78   Temp 96.4 °F (35.8 °C) (Temporal)   Resp 14   Ht 5' 11\" (1.803 m)   Wt 265 lb (120.2 kg)   LMP 03/03/2021   SpO2 95%   BMI 36.96 kg/m²      Consciousness Level  Awake  Cardiopulmonary Status  Stable  Pain Adequately Treated YES  Nausea / Vomiting  NO  Adequate Hydration  YES  Anesthesia Related Complications NONE      Electronically signed by Niraj Bland MD on 3/17/2021 at 9:00 AM

## 2021-03-30 ENCOUNTER — OFFICE VISIT (OUTPATIENT)
Dept: ORTHOPEDIC SURGERY | Age: 41
End: 2021-03-30

## 2021-03-30 VITALS — TEMPERATURE: 97.2 F | BODY MASS INDEX: 37.1 KG/M2 | RESPIRATION RATE: 14 BRPM | HEIGHT: 71 IN | WEIGHT: 265 LBS

## 2021-03-30 DIAGNOSIS — G56.02 CARPAL TUNNEL SYNDROME OF LEFT WRIST: Primary | ICD-10-CM

## 2021-03-30 PROCEDURE — 99024 POSTOP FOLLOW-UP VISIT: CPT | Performed by: ORTHOPAEDIC SURGERY

## 2021-03-30 NOTE — PROGRESS NOTES
Patient ID: Usman Mccracken is a 41 y.o. female    Chief Compliant:  No chief complaint on file. HPI:  This is a 41 y.o. female who presents to the clinic today for postoperative visit left carpal tunnel release 3/17/21. Patient states she is recovering well. Good relief of numbness and tingling. Review of Systems   All other systems reviewed and are negative.       Past History:    Current Outpatient Medications:     buPROPion (WELLBUTRIN XL) 150 MG extended release tablet, Take 150 mg by mouth every morning, Disp: , Rfl:   Allergies   Allergen Reactions    Seasonal      Social History     Socioeconomic History    Marital status:      Spouse name: Not on file    Number of children: Not on file    Years of education: Not on file    Highest education level: Not on file   Occupational History    Not on file   Social Needs    Financial resource strain: Not on file    Food insecurity     Worry: Not on file     Inability: Not on file    Transportation needs     Medical: Not on file     Non-medical: Not on file   Tobacco Use    Smoking status: Current Every Day Smoker     Packs/day: 1.00     Years: 25.00     Pack years: 25.00     Types: Cigarettes    Smokeless tobacco: Never Used   Substance and Sexual Activity    Alcohol use: Yes     Comment: BEER-1 OR 2 A MONTH    Drug use: Never    Sexual activity: Yes     Partners: Male   Lifestyle    Physical activity     Days per week: Not on file     Minutes per session: Not on file    Stress: Not on file   Relationships    Social connections     Talks on phone: Not on file     Gets together: Not on file     Attends Evangelical service: Not on file     Active member of club or organization: Not on file     Attends meetings of clubs or organizations: Not on file     Relationship status: Not on file    Intimate partner violence     Fear of current or ex partner: Not on file     Emotionally abused: Not on file     Physically abused: Not on file Forced sexual activity: Not on file   Other Topics Concern    Not on file   Social History Narrative    Not on file     Past Medical History:   Diagnosis Date    Anxiety     Depression     GERD (gastroesophageal reflux disease) 02/2020    ON RX    Hernia, ventral     Ventral hernia 2018    repaired x 3 with mesh, has another hernia as of 2020    Wears dentures     UPPER AND LOWER BUT DOES NOT WEAR    Wears glasses      Past Surgical History:   Procedure Laterality Date    CARPAL TUNNEL RELEASE Left 3/17/2021    CARPAL TUNNEL RELEASE performed by Ovidio Ann MD at [address] [facility], LAPAROSCOPIC N/A 2/10/2020    LAPAROSCOPIC CHOLECYSTECTOMY performed by Leslie Galeana DO at [address]      at age 7   [address]  [address]    UMBILICAL WITH [address] [place]  [address]    UMBILICAL  WITH [address] [place] N/A 5/20/2020    XI ROBOTIC [facility], LYSIS OF ADHESIONS performed by Leslie Galeana DO at [address]  05/20/2020    ROBOTIC LAPAROSCOPIC [address] [place], LYSIS OF ADHESIONS     TONSILLECTOMY      TOOTH EXTRACTION      all teeth including wisdom    TUBAL LIGATION  2005     Family History   Problem Relation Age of Onset    Cancer Maternal Grandmother         LUNG    Cancer Maternal Grandfather         LUNG    Cancer Paternal Grandmother         ? BRAIN    Cancer Paternal Grandfather         THROAT        Physical Exam:  Vitals signs and nursing note reviewed. Constitutional:       Appearance: She is well-developed. HENT:      Head: Normocephalic and atraumatic. Nose: Nose normal.   Eyes:      Conjunctiva/sclera: Conjunctivae normal.   Neck:      Musculoskeletal: Normal range of motion and neck supple. Pulmonary:      Effort: Pulmonary effort is normal. No respiratory distress.    Musculoskeletal:      Comments: Normal gait

## 2021-04-02 ENCOUNTER — APPOINTMENT (OUTPATIENT)
Dept: CT IMAGING | Age: 41
End: 2021-04-02
Payer: MEDICAID

## 2021-04-02 ENCOUNTER — APPOINTMENT (OUTPATIENT)
Dept: GENERAL RADIOLOGY | Age: 41
End: 2021-04-02
Payer: MEDICAID

## 2021-04-02 ENCOUNTER — HOSPITAL ENCOUNTER (EMERGENCY)
Age: 41
Discharge: HOME | End: 2021-04-02
Attending: STUDENT IN AN ORGANIZED HEALTH CARE EDUCATION/TRAINING PROGRAM
Payer: MEDICAID

## 2021-04-02 VITALS
WEIGHT: 265 LBS | HEART RATE: 92 BPM | OXYGEN SATURATION: 95 % | HEIGHT: 71 IN | RESPIRATION RATE: 18 BRPM | TEMPERATURE: 98.3 F | SYSTOLIC BLOOD PRESSURE: 112 MMHG | DIASTOLIC BLOOD PRESSURE: 68 MMHG | BODY MASS INDEX: 37.1 KG/M2

## 2021-04-02 DIAGNOSIS — R10.9 ABDOMINAL PAIN, UNSPECIFIED ABDOMINAL LOCATION: Primary | ICD-10-CM

## 2021-04-02 LAB
-: ABNORMAL
ABSOLUTE EOS #: 0.2 K/UL (ref 0–0.4)
ABSOLUTE IMMATURE GRANULOCYTE: ABNORMAL K/UL (ref 0–0.3)
ABSOLUTE LYMPH #: 2.3 K/UL (ref 1–4.8)
ABSOLUTE MONO #: 1.1 K/UL (ref 0.1–1.3)
ALBUMIN SERPL-MCNC: 3.6 G/DL (ref 3.5–5.2)
ALBUMIN/GLOBULIN RATIO: ABNORMAL (ref 1–2.5)
ALP BLD-CCNC: 67 U/L (ref 35–104)
ALT SERPL-CCNC: 19 U/L (ref 5–33)
AMORPHOUS: ABNORMAL
ANION GAP SERPL CALCULATED.3IONS-SCNC: 11 MMOL/L (ref 9–17)
AST SERPL-CCNC: 17 U/L
BACTERIA: ABNORMAL
BASOPHILS # BLD: 1 % (ref 0–2)
BASOPHILS ABSOLUTE: 0.1 K/UL (ref 0–0.2)
BILIRUB SERPL-MCNC: 0.93 MG/DL (ref 0.3–1.2)
BILIRUBIN URINE: NEGATIVE
BUN BLDV-MCNC: 7 MG/DL (ref 6–20)
BUN/CREAT BLD: ABNORMAL (ref 9–20)
CALCIUM SERPL-MCNC: 9.1 MG/DL (ref 8.6–10.4)
CASTS UA: ABNORMAL /LPF
CHLORIDE BLD-SCNC: 107 MMOL/L (ref 98–107)
CO2: 25 MMOL/L (ref 20–31)
COLOR: ABNORMAL
COMMENT UA: ABNORMAL
CREAT SERPL-MCNC: 0.57 MG/DL (ref 0.5–0.9)
CRYSTALS, UA: ABNORMAL /HPF
D-DIMER QUANTITATIVE: 1.18 MG/L FEU (ref 0–0.59)
DIFFERENTIAL TYPE: ABNORMAL
EKG ATRIAL RATE: 99 BPM
EKG P AXIS: 59 DEGREES
EKG P-R INTERVAL: 144 MS
EKG Q-T INTERVAL: 340 MS
EKG QRS DURATION: 74 MS
EKG QTC CALCULATION (BAZETT): 436 MS
EKG R AXIS: 74 DEGREES
EKG T AXIS: 42 DEGREES
EKG VENTRICULAR RATE: 99 BPM
EOSINOPHILS RELATIVE PERCENT: 2 % (ref 0–4)
EPITHELIAL CELLS UA: ABNORMAL /HPF
GFR AFRICAN AMERICAN: >60 ML/MIN
GFR NON-AFRICAN AMERICAN: >60 ML/MIN
GFR SERPL CREATININE-BSD FRML MDRD: ABNORMAL ML/MIN/{1.73_M2}
GFR SERPL CREATININE-BSD FRML MDRD: ABNORMAL ML/MIN/{1.73_M2}
GLUCOSE BLD-MCNC: 114 MG/DL (ref 70–99)
GLUCOSE URINE: NEGATIVE
HCG QUALITATIVE: NEGATIVE
HCT VFR BLD CALC: 39.5 % (ref 36–46)
HEMOGLOBIN: 13.5 G/DL (ref 12–16)
IMMATURE GRANULOCYTES: ABNORMAL %
KETONES, URINE: NEGATIVE
LEUKOCYTE ESTERASE, URINE: NEGATIVE
LIPASE: 35 U/L (ref 13–60)
LYMPHOCYTES # BLD: 19 % (ref 24–44)
MCH RBC QN AUTO: 31.5 PG (ref 26–34)
MCHC RBC AUTO-ENTMCNC: 34.3 G/DL (ref 31–37)
MCV RBC AUTO: 91.8 FL (ref 80–100)
MONOCYTES # BLD: 9 % (ref 1–7)
MUCUS: ABNORMAL
NITRITE, URINE: NEGATIVE
NRBC AUTOMATED: ABNORMAL PER 100 WBC
OTHER OBSERVATIONS UA: ABNORMAL
PDW BLD-RTO: 13.7 % (ref 11.5–14.9)
PH UA: 8 (ref 5–8)
PLATELET # BLD: 271 K/UL (ref 150–450)
PLATELET ESTIMATE: ABNORMAL
PMV BLD AUTO: 8.4 FL (ref 6–12)
POTASSIUM SERPL-SCNC: 4 MMOL/L (ref 3.7–5.3)
PROTEIN UA: NEGATIVE
RBC # BLD: 4.3 M/UL (ref 4–5.2)
RBC # BLD: ABNORMAL 10*6/UL
RBC UA: ABNORMAL /HPF
RENAL EPITHELIAL, UA: ABNORMAL /HPF
SEG NEUTROPHILS: 69 % (ref 36–66)
SEGMENTED NEUTROPHILS ABSOLUTE COUNT: 8.6 K/UL (ref 1.3–9.1)
SODIUM BLD-SCNC: 143 MMOL/L (ref 135–144)
SPECIFIC GRAVITY UA: 1.02 (ref 1–1.03)
TOTAL PROTEIN: 6.6 G/DL (ref 6.4–8.3)
TRICHOMONAS: ABNORMAL
TROPONIN INTERP: NORMAL
TROPONIN INTERP: NORMAL
TROPONIN T: NORMAL NG/ML
TROPONIN T: NORMAL NG/ML
TROPONIN, HIGH SENSITIVITY: <6 NG/L (ref 0–14)
TROPONIN, HIGH SENSITIVITY: <6 NG/L (ref 0–14)
TURBIDITY: ABNORMAL
URINE HGB: NEGATIVE
UROBILINOGEN, URINE: NORMAL
WBC # BLD: 12.4 K/UL (ref 3.5–11)
WBC # BLD: ABNORMAL 10*3/UL
WBC UA: ABNORMAL /HPF
YEAST: ABNORMAL

## 2021-04-02 PROCEDURE — 85379 FIBRIN DEGRADATION QUANT: CPT

## 2021-04-02 PROCEDURE — 83690 ASSAY OF LIPASE: CPT

## 2021-04-02 PROCEDURE — 71260 CT THORAX DX C+: CPT

## 2021-04-02 PROCEDURE — 71045 X-RAY EXAM CHEST 1 VIEW: CPT

## 2021-04-02 PROCEDURE — 81001 URINALYSIS AUTO W/SCOPE: CPT

## 2021-04-02 PROCEDURE — 96375 TX/PRO/DX INJ NEW DRUG ADDON: CPT

## 2021-04-02 PROCEDURE — 99283 EMERGENCY DEPT VISIT LOW MDM: CPT

## 2021-04-02 PROCEDURE — 84484 ASSAY OF TROPONIN QUANT: CPT

## 2021-04-02 PROCEDURE — 96374 THER/PROPH/DIAG INJ IV PUSH: CPT

## 2021-04-02 PROCEDURE — 6360000004 HC RX CONTRAST MEDICATION: Performed by: STUDENT IN AN ORGANIZED HEALTH CARE EDUCATION/TRAINING PROGRAM

## 2021-04-02 PROCEDURE — 80053 COMPREHEN METABOLIC PANEL: CPT

## 2021-04-02 PROCEDURE — 2580000003 HC RX 258: Performed by: STUDENT IN AN ORGANIZED HEALTH CARE EDUCATION/TRAINING PROGRAM

## 2021-04-02 PROCEDURE — 74177 CT ABD & PELVIS W/CONTRAST: CPT

## 2021-04-02 PROCEDURE — 93005 ELECTROCARDIOGRAM TRACING: CPT | Performed by: STUDENT IN AN ORGANIZED HEALTH CARE EDUCATION/TRAINING PROGRAM

## 2021-04-02 PROCEDURE — 36415 COLL VENOUS BLD VENIPUNCTURE: CPT

## 2021-04-02 PROCEDURE — 93010 ELECTROCARDIOGRAM REPORT: CPT | Performed by: INTERNAL MEDICINE

## 2021-04-02 PROCEDURE — 84703 CHORIONIC GONADOTROPIN ASSAY: CPT

## 2021-04-02 PROCEDURE — 85025 COMPLETE CBC W/AUTO DIFF WBC: CPT

## 2021-04-02 PROCEDURE — 6360000002 HC RX W HCPCS: Performed by: STUDENT IN AN ORGANIZED HEALTH CARE EDUCATION/TRAINING PROGRAM

## 2021-04-02 RX ORDER — IBUPROFEN 400 MG/1
400 TABLET ORAL EVERY 6 HOURS PRN
Qty: 120 TABLET | Refills: 0 | Status: ON HOLD | OUTPATIENT
Start: 2021-04-02 | End: 2021-05-05 | Stop reason: HOSPADM

## 2021-04-02 RX ORDER — MORPHINE SULFATE 4 MG/ML
4 INJECTION, SOLUTION INTRAMUSCULAR; INTRAVENOUS ONCE
Status: COMPLETED | OUTPATIENT
Start: 2021-04-02 | End: 2021-04-02

## 2021-04-02 RX ORDER — FENTANYL CITRATE 50 UG/ML
100 INJECTION, SOLUTION INTRAMUSCULAR; INTRAVENOUS ONCE
Status: DISCONTINUED | OUTPATIENT
Start: 2021-04-02 | End: 2021-04-02 | Stop reason: HOSPADM

## 2021-04-02 RX ORDER — 0.9 % SODIUM CHLORIDE 0.9 %
1000 INTRAVENOUS SOLUTION INTRAVENOUS ONCE
Status: COMPLETED | OUTPATIENT
Start: 2021-04-02 | End: 2021-04-02

## 2021-04-02 RX ORDER — ONDANSETRON 2 MG/ML
4 INJECTION INTRAMUSCULAR; INTRAVENOUS ONCE
Status: COMPLETED | OUTPATIENT
Start: 2021-04-02 | End: 2021-04-02

## 2021-04-02 RX ORDER — 0.9 % SODIUM CHLORIDE 0.9 %
80 INTRAVENOUS SOLUTION INTRAVENOUS ONCE
Status: COMPLETED | OUTPATIENT
Start: 2021-04-02 | End: 2021-04-02

## 2021-04-02 RX ORDER — SODIUM CHLORIDE 0.9 % (FLUSH) 0.9 %
10 SYRINGE (ML) INJECTION PRN
Status: DISCONTINUED | OUTPATIENT
Start: 2021-04-02 | End: 2021-04-02 | Stop reason: HOSPADM

## 2021-04-02 RX ADMIN — SODIUM CHLORIDE 1000 ML: 9 INJECTION, SOLUTION INTRAVENOUS at 07:48

## 2021-04-02 RX ADMIN — MORPHINE SULFATE 4 MG: 4 INJECTION, SOLUTION INTRAMUSCULAR; INTRAVENOUS at 09:29

## 2021-04-02 RX ADMIN — IOPAMIDOL 100 ML: 755 INJECTION, SOLUTION INTRAVENOUS at 08:34

## 2021-04-02 RX ADMIN — SODIUM CHLORIDE 80 ML: 9 INJECTION, SOLUTION INTRAVENOUS at 08:34

## 2021-04-02 RX ADMIN — SODIUM CHLORIDE, PRESERVATIVE FREE 10 ML: 5 INJECTION INTRAVENOUS at 08:34

## 2021-04-02 RX ADMIN — ONDANSETRON 4 MG: 2 INJECTION, SOLUTION INTRAMUSCULAR; INTRAVENOUS at 09:29

## 2021-04-02 ASSESSMENT — ENCOUNTER SYMPTOMS
RHINORRHEA: 0
PHOTOPHOBIA: 0
EYE ITCHING: 0
DIARRHEA: 1
VOMITING: 1
SHORTNESS OF BREATH: 1
COLOR CHANGE: 0
ABDOMINAL PAIN: 1
FACIAL SWELLING: 0
COUGH: 0
NAUSEA: 1

## 2021-04-02 ASSESSMENT — PAIN SCALES - GENERAL: PAINLEVEL_OUTOF10: 10

## 2021-04-02 NOTE — ED NOTES
Discharge instructions reviewed with patient with all questions asked and answered. Patient dressed and transferred off unit under own power without incident.       Cassy Butler RN  04/02/21 0111

## 2021-04-02 NOTE — ED PROVIDER NOTES
EMERGENCY DEPARTMENT ENCOUNTER    Pt Name: Rawland Schwab  MRN: [id number]  [place] 1980  Date of evaluation: 4/2/21  CHIEF COMPLAINT       Chief Complaint   Patient presents with    Abdominal Pain     HISTORY OF PRESENT ILLNESS   HPI  53-year-old female history of GERD, status post cholecystectomy and incisional hernia repair presents for evaluation of upper abdominal pain. Symptoms started about 2 days ago. Describes sharp nonradiating pain. Has associated vomiting and diarrhea. No fever chills. Pain is worse in the right upper quadrant. Moderate and intermittent. Does describe associated shortness of breath with ambulation. No lower extremity swelling or pain. Did recently have a carpal tunnel repair about 2 weeks ago but has not otherwise been hospitalized recently. No home treatment prior to arrival.      REVIEW OF SYSTEMS     Review of Systems   Constitutional: Negative for chills and fatigue. HENT: Negative for facial swelling, postnasal drip and rhinorrhea. Eyes: Negative for photophobia and itching. Respiratory: Positive for shortness of breath. Negative for cough. Cardiovascular: Negative for chest pain and leg swelling. Gastrointestinal: Positive for abdominal pain, diarrhea, nausea and vomiting. Genitourinary: Negative for dysuria, flank pain and hematuria. Musculoskeletal: Negative for arthralgias and joint swelling. Skin: Negative for color change and rash. Neurological: Negative for dizziness, numbness and headaches.      PASTMEDICAL HISTORY     Past Medical History:   Diagnosis Date    Anxiety     Depression     GERD (gastroesophageal reflux disease) 02/2020    ON RX    Hernia, ventral     Ventral hernia 2018    repaired x 3 with mesh, has another hernia as of 2020    Wears dentures     UPPER AND LOWER BUT DOES NOT WEAR    Wears glasses      Past Problem List  Patient Active Problem List   Diagnosis Code    Cholelithiasis and cholecystitis without obstruction K80.10    Recurrent incisional hernia with incarceration K43.0    Morbid obesity (HCC) E66.01    Recurrent incisional hernia K43.2    Left carpal tunnel syndrome G56.02     SURGICAL HISTORY       Past Surgical History:   Procedure Laterality Date    CARPAL TUNNEL RELEASE Left 3/17/2021    CARPAL TUNNEL RELEASE performed by Charles Vance MD at [facility], LAPAROSCOPIC N/A 2/10/2020    LAPAROSCOPIC CHOLECYSTECTOMY performed by Pablito Miles DO at [facility]      at age 7   [address]  [address]    UMBILICAL WITH MESH    HERNIA REPAIR  6360    UMBILICAL  WITH MESH    HERNIA REPAIR N/A 2020    XI ROBOTIC LAPAROSCOPIC [address] [place], LYSIS OF ADHESIONS performed by Pablito Miles DO at [address]  2020    ROBOTIC [address] [facility], LYSIS OF ADHESIONS     TONSILLECTOMY      TOOTH EXTRACTION      all teeth including wisdom    TUBAL LIGATION       CURRENT MEDICATIONS       Discharge Medication List as of 2021 10:21 AM      CONTINUE these medications which have NOT CHANGED    Details   buPROPion (WELLBUTRIN XL) 150 MG extended release tablet Take 150 mg by mouth every morningHistorical Med           ALLERGIES     is allergic to seasonal.  FAMILY HISTORY     She indicated that her mother is alive. She indicated that her father is alive. She indicated that both of her brothers are alive. She indicated that her maternal grandmother is . She indicated that her maternal grandfather is . She indicated that her paternal grandmother is . She indicated that her paternal grandfather is .      SOCIAL HISTORY       Social History     Tobacco Use    Smoking status: Current Every Day Smoker     Packs/day: 0.50     Years: 25.00     Pack years: 12.50     Types: Cigarettes    Smokeless tobacco: Never Used    Tobacco comment: 3/30/21: cut down to 1/2 ppd, now, from 1 ppd previously. Substance Use Topics    Alcohol use: Yes     Comment: BEER-1 OR 2 A MONTH    Drug use: Never     PHYSICAL EXAM     INITIAL VITALS: /68   Pulse 92   Temp 98.3 °F (36.8 °C)   Resp 18   Ht 5' 11\" (1.803 m)   Wt 265 lb (120.2 kg)   LMP 2021   SpO2 95%   BMI 36.96 kg/m²    Physical Exam  Constitutional:       Appearance: She is normal weight. HENT:      Head: Normocephalic and atraumatic. Eyes:      Extraocular Movements: Extraocular movements intact. Pupils: Pupils are equal, round, and reactive to light. Neck:      Musculoskeletal: Normal range of motion and neck supple. Cardiovascular:      Rate and Rhythm: Normal rate and regular rhythm. Pulmonary:      Effort: Pulmonary effort is normal.      Breath sounds: Normal breath sounds. Abdominal:      General: Abdomen is flat. There is no distension. Palpations: There is no mass. Comments: Right upper quadrant and epigastric tenderness to palpation. Soft abdomen. No lower abdominal tenderness. Musculoskeletal: Normal range of motion. General: No swelling. Skin:     General: Skin is warm and dry. Neurological:      General: No focal deficit present. Mental Status: She is alert. Mental status is at baseline. MEDICAL DECISION MAKIN-year-old female presents for evaluation of epigastric and right upper quadrant pain. Additionally has some exertional shortness of breath. Work-up for ACS, pneumonia, pulmonary embolism, pneumothorax, pleural effusion, pancreatitis, hepatitis, gastritis, perforated viscus, renal colic. Labs unremarkable. Mild leukocytosis at 12.4. CT scan showed inflammatory change in the right upper quadrant with peritoneal fat infiltration. Small fat-containing umbilical hernia.   Discussed findings with patient surgeon Dr. Alena Crane who recommended discharge home he will see the patient in clinic in follow-up in several days. Patient comfortable on reassessment. Agreeable with plan. CRITICAL CARE:       PROCEDURES:    Procedures    DIAGNOSTIC RESULTS   EKG:All EKG's are interpreted by the Emergency Department Physician who either signs or Co-signs this chart in the absence of a cardiologist.    Sinus rhythm rate of 99 normal axis normal intervals no other concerning ST or T wave changes    RADIOLOGY:All plain film, CT, MRI, and formal ultrasound images (except ED bedside ultrasound) are read by the radiologist, see reports below, unless otherwisenoted in MDM or here. CT ABDOMEN PELVIS W IV CONTRAST Additional Contrast? None   Final Result   Chest:      1. Suboptimal study for pulmonary embolism evaluation. No large central   embolus in the pulmonary trunk or right or left pulmonary arteries. Smaller   emboli particularly in lobar, segmental branches and beyond may be obscured. 2. No evidence for pneumonia. Abdomen pelvis:      1. Cholecystectomy performed in February 2020. Extensive right upper abdomen   peritoneal fat infiltration noted anteriorly indicative of an   infectious/inflammatory process but the exact cause is not readily apparent. There are no associated fluid collections. 2. Small fat containing umbilical hernia with a small portion of the   transverse colon encroaching into the hernia. Of note there is a ventral   hernia repair mesh graft in place. CT CHEST PULMONARY EMBOLISM W CONTRAST   Final Result   Chest:      1. Suboptimal study for pulmonary embolism evaluation. No large central   embolus in the pulmonary trunk or right or left pulmonary arteries. Smaller   emboli particularly in lobar, segmental branches and beyond may be obscured. 2. No evidence for pneumonia. Abdomen pelvis:      1. Cholecystectomy performed in February 2020.   Extensive right upper abdomen   peritoneal fat infiltration noted anteriorly indicative of an   infectious/inflammatory process sodium chloride bolus    DISCONTD: sodium chloride flush 0.9 % injection 10 mL    iopamidol (ISOVUE-370) 76 % injection 100 mL    DISCONTD: fentaNYL (SUBLIMAZE) injection 100 mcg    ibuprofen (IBU) 400 MG tablet     Sig: Take 1 tablet by mouth every 6 hours as needed for Pain     Dispense:  120 tablet     Refill:  0     CONSULTS:  IP CONSULT TO GENERAL SURGERY    FINAL IMPRESSION      1.  Abdominal pain, unspecified abdominal location          DISPOSITION/PLAN   DISPOSITION Decision To Discharge 04/02/2021 10:21:17 AM      PATIENT REFERRED TO:  DO Elena Wagner [place] [address] [address] [facility]  [phone]    Call today  Go to the office on [address]:  Discharge Medication List as of 4/2/2021 10:21 AM        Mark Ann MD  Attending Emergency Physician                    Mark Ann MD  04/02/21 9763

## 2021-04-27 ENCOUNTER — OFFICE VISIT (OUTPATIENT)
Dept: ORTHOPEDIC SURGERY | Age: 41
End: 2021-04-27

## 2021-04-27 ENCOUNTER — TELEPHONE (OUTPATIENT)
Dept: ORTHOPEDIC SURGERY | Age: 41
End: 2021-04-27

## 2021-04-27 VITALS — WEIGHT: 265 LBS | BODY MASS INDEX: 37.1 KG/M2 | HEIGHT: 71 IN

## 2021-04-27 DIAGNOSIS — G56.02 CARPAL TUNNEL SYNDROME OF LEFT WRIST: Primary | ICD-10-CM

## 2021-04-27 PROCEDURE — 99024 POSTOP FOLLOW-UP VISIT: CPT | Performed by: ORTHOPAEDIC SURGERY

## 2021-04-27 NOTE — TELEPHONE ENCOUNTER
Mehul Chicas from the law offices of [address] [place] [address] called to request office notes for the patient.   She states that the patient has a hearing coming up on May 3rd and they need all office notes/MRIs/etc from 21 - 3/30/21 faxed to the [phone]    If you have questions or need to speak with Mehul Chicas directly, please call her at [phone]

## 2021-04-27 NOTE — TELEPHONE ENCOUNTER
Called and left voice message that medical record request are handled by [id number] [place] [address] and their fax number is , for them to fax over a request for the records.

## 2021-04-27 NOTE — PROGRESS NOTES
Patient ID: Karen Jeffrey is a 41 y.o. female. Chief Complaint   Patient presents with    Post-Op Check     left carpal tunnel release 3/17/21        HPI   Please refer to my previous clinic notes. Patient is here for follow-up carpal tunnel release. Patient initially been seen under Workmen's Comp. apparently was denied Workmen's Comp. on this venture. Nevertheless I do believe that this is likely Workmen's Comp. as carpal tunnel syndrome does not typically resolve even though the symptoms may get better temporarily      Past Medical History:   Diagnosis Date    Anxiety     Depression     GERD (gastroesophageal reflux disease) 02/2020    ON RX    Hernia, ventral     Ventral hernia 2018    repaired x 3 with mesh, has another hernia as of 2020    Wears dentures     UPPER AND LOWER BUT DOES NOT WEAR    Wears glasses      Past Surgical History:   Procedure Laterality Date    CARPAL TUNNEL RELEASE Left 3/17/2021    CARPAL TUNNEL RELEASE performed by Natalia Sullivan MD at [address] [place] [address], LAPAROSCOPIC N/A 2/10/2020    LAPAROSCOPIC CHOLECYSTECTOMY performed by Carmenza Casey DO at [address]      at age 7   [address]  [address]    UMBILICAL WITH [address] [place]  [address]    UMBILICAL  WITH [address] [place] N/A 5/20/2020    XI ROBOTIC [facility], LYSIS OF ADHESIONS performed by Carmenza Casey DO at [address]  05/20/2020    ROBOTIC LAPAROSCOPIC [address] [place], LYSIS OF ADHESIONS     TONSILLECTOMY      TOOTH EXTRACTION      all teeth including wisdom    TUBAL LIGATION  2005     Family History   Problem Relation Age of Onset    Cancer Maternal Grandmother         LUNG    Cancer Maternal Grandfather         LUNG    Cancer Paternal Grandmother         ?  BRAIN    Cancer Paternal Grandfather         THROAT     Social History Occupational History    Not on file   Tobacco Use    Smoking status: Current Every Day Smoker     Packs/day: 0.50     Years: 25.00     Pack years: 12.50     Types: Cigarettes    Smokeless tobacco: Never Used    Tobacco comment: 3/30/21: cut down to 1/2 ppd, now, from 1 ppd previously. Substance and Sexual Activity    Alcohol use: Yes     Comment: BEER-1 OR 2 A MONTH    Drug use: Never    Sexual activity: Yes     Partners: Male        Review of Systems   All other systems reviewed and are negative. Physical Exam  Vitals signs and nursing note reviewed. Constitutional:       Appearance: She is well-developed. HENT:      Head: Normocephalic and atraumatic. Nose: Nose normal.   Eyes:      Conjunctiva/sclera: Conjunctivae normal.   Neck:      Musculoskeletal: Normal range of motion and neck supple. Pulmonary:      Effort: Pulmonary effort is normal. No respiratory distress. Musculoskeletal:      Comments: Normal gait     Skin:     General: Skin is warm and dry. Neurological:      Mental Status: She is alert and oriented to person, place, and time. Sensory: No sensory deficit. Psychiatric:         Behavior: Behavior normal.         Thought Content: Thought content normal.         Assessment:          1. Carpal tunnel syndrome of left wrist      Still some light tenderness periincisional there is dramatically improved    Patient initially been seen under Workmen's Comp. apparently was denied Workmen's Comp. on this venture. Nevertheless I do believe that this is likely Workmen's Comp. as carpal tunnel syndrome does not typically resolve even though the symptoms may get better temporarily        Plan:     Follow-up as needed    No orders of the defined types were placed in this encounter. Christopher Zhou MD    Please note that this chart was generated using voicerecognition Dragon dictation software.   Although every effort was made to ensurethe accuracy of this automated transcription, some errors in transcription may haveoccurred.

## 2021-05-01 ENCOUNTER — HOSPITAL ENCOUNTER (OUTPATIENT)
Dept: LAB | Age: 41
Setting detail: SPECIMEN
Discharge: HOME | End: 2021-05-01
Payer: MEDICAID

## 2021-05-01 DIAGNOSIS — Z01.818 PREOP TESTING: Primary | ICD-10-CM

## 2021-05-01 LAB
SARS-COV-2: NORMAL
SARS-COV-2: NOT DETECTED
SOURCE: NORMAL

## 2021-05-01 PROCEDURE — U0003 INFECTIOUS AGENT DETECTION BY NUCLEIC ACID (DNA OR RNA); SEVERE ACUTE RESPIRATORY SYNDROME CORONAVIRUS 2 (SARS-COV-2) (CORONAVIRUS DISEASE [COVID-19]), AMPLIFIED PROBE TECHNIQUE, MAKING USE OF HIGH THROUGHPUT TECHNOLOGIES AS DESCRIBED BY CMS-2020-01-R: HCPCS

## 2021-05-01 PROCEDURE — U0005 INFEC AGEN DETEC AMPLI PROBE: HCPCS

## 2021-05-05 ENCOUNTER — ANESTHESIA EVENT (OUTPATIENT)
Dept: OPERATING ROOM | Age: 41
End: 2021-05-05
Payer: MEDICAID

## 2021-05-05 ENCOUNTER — ANESTHESIA (OUTPATIENT)
Dept: OPERATING ROOM | Age: 41
End: 2021-05-05
Payer: MEDICAID

## 2021-05-05 ENCOUNTER — HOSPITAL ENCOUNTER (OUTPATIENT)
Age: 41
Setting detail: OUTPATIENT SURGERY
Discharge: HOME | End: 2021-05-05
Attending: SURGERY | Admitting: SURGERY
Payer: MEDICAID

## 2021-05-05 VITALS — SYSTOLIC BLOOD PRESSURE: 131 MMHG | OXYGEN SATURATION: 95 % | DIASTOLIC BLOOD PRESSURE: 76 MMHG | TEMPERATURE: 96.4 F

## 2021-05-05 VITALS
HEIGHT: 71 IN | DIASTOLIC BLOOD PRESSURE: 83 MMHG | HEART RATE: 78 BPM | BODY MASS INDEX: 36.4 KG/M2 | TEMPERATURE: 97.5 F | OXYGEN SATURATION: 96 % | SYSTOLIC BLOOD PRESSURE: 107 MMHG | WEIGHT: 260 LBS | RESPIRATION RATE: 14 BRPM

## 2021-05-05 PROBLEM — K42.9 UMBILICAL HERNIA WITHOUT OBSTRUCTION AND WITHOUT GANGRENE: Chronic | Status: ACTIVE | Noted: 2021-05-05

## 2021-05-05 LAB
ABSOLUTE EOS #: 0.36 K/UL (ref 0–0.44)
ABSOLUTE IMMATURE GRANULOCYTE: 0.04 K/UL (ref 0–0.3)
ABSOLUTE LYMPH #: 2.88 K/UL (ref 1.1–3.7)
ABSOLUTE MONO #: 0.6 K/UL (ref 0.1–1.2)
BASOPHILS # BLD: 1 % (ref 0–2)
BASOPHILS ABSOLUTE: 0.09 K/UL (ref 0–0.2)
DIFFERENTIAL TYPE: NORMAL
EOSINOPHILS RELATIVE PERCENT: 4 % (ref 1–4)
HCG, PREGNANCY URINE (POC): NEGATIVE
HCT VFR BLD CALC: 42.4 % (ref 36.3–47.1)
HEMOGLOBIN: 13.9 G/DL (ref 11.9–15.1)
IMMATURE GRANULOCYTES: 0 %
LYMPHOCYTES # BLD: 31 % (ref 24–43)
MCH RBC QN AUTO: 30.5 PG (ref 25.2–33.5)
MCHC RBC AUTO-ENTMCNC: 32.8 G/DL (ref 28.4–34.8)
MCV RBC AUTO: 93 FL (ref 82.6–102.9)
MONOCYTES # BLD: 7 % (ref 3–12)
NRBC AUTOMATED: 0 PER 100 WBC
PDW BLD-RTO: 13 % (ref 11.8–14.4)
PLATELET # BLD: 272 K/UL (ref 138–453)
PLATELET ESTIMATE: NORMAL
PMV BLD AUTO: 10.2 FL (ref 8.1–13.5)
RBC # BLD: 4.56 M/UL (ref 3.95–5.11)
RBC # BLD: NORMAL 10*6/UL
SEG NEUTROPHILS: 57 % (ref 36–65)
SEGMENTED NEUTROPHILS ABSOLUTE COUNT: 5.29 K/UL (ref 1.5–8.1)
WBC # BLD: 9.3 K/UL (ref 3.5–11.3)
WBC # BLD: NORMAL 10*3/UL

## 2021-05-05 PROCEDURE — 7100000011 HC PHASE II RECOVERY - ADDTL 15 MIN: Performed by: SURGERY

## 2021-05-05 PROCEDURE — 7100000000 HC PACU RECOVERY - FIRST 15 MIN: Performed by: SURGERY

## 2021-05-05 PROCEDURE — 3700000000 HC ANESTHESIA ATTENDED CARE: Performed by: SURGERY

## 2021-05-05 PROCEDURE — 81025 URINE PREGNANCY TEST: CPT

## 2021-05-05 PROCEDURE — 2580000003 HC RX 258: Performed by: ANESTHESIOLOGY

## 2021-05-05 PROCEDURE — 7100000010 HC PHASE II RECOVERY - FIRST 15 MIN: Performed by: SURGERY

## 2021-05-05 PROCEDURE — 3700000001 HC ADD 15 MINUTES (ANESTHESIA): Performed by: SURGERY

## 2021-05-05 PROCEDURE — 6360000002 HC RX W HCPCS

## 2021-05-05 PROCEDURE — 2500000003 HC RX 250 WO HCPCS

## 2021-05-05 PROCEDURE — 85025 COMPLETE CBC W/AUTO DIFF WBC: CPT

## 2021-05-05 PROCEDURE — 3600000003 HC SURGERY LEVEL 3 BASE: Performed by: SURGERY

## 2021-05-05 PROCEDURE — 2500000003 HC RX 250 WO HCPCS: Performed by: SURGERY

## 2021-05-05 PROCEDURE — 88302 TISSUE EXAM BY PATHOLOGIST: CPT

## 2021-05-05 PROCEDURE — 3600000013 HC SURGERY LEVEL 3 ADDTL 15MIN: Performed by: SURGERY

## 2021-05-05 PROCEDURE — 7100000001 HC PACU RECOVERY - ADDTL 15 MIN: Performed by: SURGERY

## 2021-05-05 PROCEDURE — 6360000002 HC RX W HCPCS: Performed by: SURGERY

## 2021-05-05 PROCEDURE — 2709999900 HC NON-CHARGEABLE SUPPLY: Performed by: SURGERY

## 2021-05-05 RX ORDER — DEXAMETHASONE SODIUM PHOSPHATE 10 MG/ML
INJECTION, SOLUTION INTRAMUSCULAR; INTRAVENOUS PRN
Status: DISCONTINUED | OUTPATIENT
Start: 2021-05-05 | End: 2021-05-05 | Stop reason: SDUPTHER

## 2021-05-05 RX ORDER — OXYCODONE HYDROCHLORIDE AND ACETAMINOPHEN 5; 325 MG/1; MG/1
1 TABLET ORAL
Status: DISCONTINUED | OUTPATIENT
Start: 2021-05-05 | End: 2021-05-05 | Stop reason: HOSPADM

## 2021-05-05 RX ORDER — IBUPROFEN 200 MG
800 TABLET ORAL EVERY 8 HOURS PRN
Status: DISCONTINUED | OUTPATIENT
Start: 2021-05-05 | End: 2021-05-05 | Stop reason: HOSPADM

## 2021-05-05 RX ORDER — HYDROMORPHONE HYDROCHLORIDE 1 MG/ML
0.5 INJECTION, SOLUTION INTRAMUSCULAR; INTRAVENOUS; SUBCUTANEOUS EVERY 5 MIN PRN
Status: DISCONTINUED | OUTPATIENT
Start: 2021-05-05 | End: 2021-05-05 | Stop reason: HOSPADM

## 2021-05-05 RX ORDER — MEPERIDINE HYDROCHLORIDE 50 MG/ML
12.5 INJECTION INTRAMUSCULAR; INTRAVENOUS; SUBCUTANEOUS EVERY 5 MIN PRN
Status: DISCONTINUED | OUTPATIENT
Start: 2021-05-05 | End: 2021-05-05 | Stop reason: HOSPADM

## 2021-05-05 RX ORDER — SODIUM CHLORIDE, SODIUM LACTATE, POTASSIUM CHLORIDE, CALCIUM CHLORIDE 600; 310; 30; 20 MG/100ML; MG/100ML; MG/100ML; MG/100ML
INJECTION, SOLUTION INTRAVENOUS CONTINUOUS
Status: DISCONTINUED | OUTPATIENT
Start: 2021-05-06 | End: 2021-05-05 | Stop reason: HOSPADM

## 2021-05-05 RX ORDER — HYDRALAZINE HYDROCHLORIDE 20 MG/ML
5 INJECTION INTRAMUSCULAR; INTRAVENOUS EVERY 10 MIN PRN
Status: DISCONTINUED | OUTPATIENT
Start: 2021-05-05 | End: 2021-05-05 | Stop reason: HOSPADM

## 2021-05-05 RX ORDER — ONDANSETRON 2 MG/ML
4 INJECTION INTRAMUSCULAR; INTRAVENOUS
Status: DISCONTINUED | OUTPATIENT
Start: 2021-05-05 | End: 2021-05-05 | Stop reason: HOSPADM

## 2021-05-05 RX ORDER — LIDOCAINE HYDROCHLORIDE 20 MG/ML
INJECTION, SOLUTION EPIDURAL; INFILTRATION; INTRACAUDAL; PERINEURAL PRN
Status: DISCONTINUED | OUTPATIENT
Start: 2021-05-05 | End: 2021-05-05 | Stop reason: SDUPTHER

## 2021-05-05 RX ORDER — ROCURONIUM BROMIDE 10 MG/ML
INJECTION, SOLUTION INTRAVENOUS PRN
Status: DISCONTINUED | OUTPATIENT
Start: 2021-05-05 | End: 2021-05-05 | Stop reason: SDUPTHER

## 2021-05-05 RX ORDER — BUPIVACAINE HYDROCHLORIDE AND EPINEPHRINE 5; 5 MG/ML; UG/ML
INJECTION, SOLUTION EPIDURAL; INTRACAUDAL; PERINEURAL PRN
Status: DISCONTINUED | OUTPATIENT
Start: 2021-05-05 | End: 2021-05-05 | Stop reason: ALTCHOICE

## 2021-05-05 RX ORDER — LIDOCAINE HYDROCHLORIDE 10 MG/ML
1 INJECTION, SOLUTION EPIDURAL; INFILTRATION; INTRACAUDAL; PERINEURAL
Status: DISCONTINUED | OUTPATIENT
Start: 2021-05-05 | End: 2021-05-05 | Stop reason: HOSPADM

## 2021-05-05 RX ORDER — IBUPROFEN 800 MG/1
800 TABLET ORAL EVERY 8 HOURS PRN
Qty: 120 TABLET | Refills: 3 | Status: SHIPPED | OUTPATIENT
Start: 2021-05-05

## 2021-05-05 RX ORDER — ONDANSETRON 2 MG/ML
INJECTION INTRAMUSCULAR; INTRAVENOUS PRN
Status: DISCONTINUED | OUTPATIENT
Start: 2021-05-05 | End: 2021-05-05 | Stop reason: SDUPTHER

## 2021-05-05 RX ORDER — PROPOFOL 10 MG/ML
INJECTION, EMULSION INTRAVENOUS PRN
Status: DISCONTINUED | OUTPATIENT
Start: 2021-05-05 | End: 2021-05-05 | Stop reason: SDUPTHER

## 2021-05-05 RX ORDER — LABETALOL HYDROCHLORIDE 5 MG/ML
5 INJECTION, SOLUTION INTRAVENOUS EVERY 10 MIN PRN
Status: DISCONTINUED | OUTPATIENT
Start: 2021-05-05 | End: 2021-05-05 | Stop reason: HOSPADM

## 2021-05-05 RX ORDER — FENTANYL CITRATE 50 UG/ML
INJECTION, SOLUTION INTRAMUSCULAR; INTRAVENOUS PRN
Status: DISCONTINUED | OUTPATIENT
Start: 2021-05-05 | End: 2021-05-05 | Stop reason: SDUPTHER

## 2021-05-05 RX ORDER — MIDAZOLAM HYDROCHLORIDE 1 MG/ML
INJECTION INTRAMUSCULAR; INTRAVENOUS PRN
Status: DISCONTINUED | OUTPATIENT
Start: 2021-05-05 | End: 2021-05-05 | Stop reason: SDUPTHER

## 2021-05-05 RX ADMIN — SODIUM CHLORIDE, POTASSIUM CHLORIDE, SODIUM LACTATE AND CALCIUM CHLORIDE: 600; 310; 30; 20 INJECTION, SOLUTION INTRAVENOUS at 12:27

## 2021-05-05 RX ADMIN — PROPOFOL 150 MG: 10 INJECTION, EMULSION INTRAVENOUS at 13:45

## 2021-05-05 RX ADMIN — ONDANSETRON 4 MG: 2 INJECTION, SOLUTION INTRAMUSCULAR; INTRAVENOUS at 13:47

## 2021-05-05 RX ADMIN — DEXAMETHASONE SODIUM PHOSPHATE 10 MG: 10 INJECTION INTRAMUSCULAR; INTRAVENOUS at 13:47

## 2021-05-05 RX ADMIN — CEFAZOLIN 2000 MG: 10 INJECTION, POWDER, FOR SOLUTION INTRAVENOUS at 13:52

## 2021-05-05 RX ADMIN — LIDOCAINE HYDROCHLORIDE 100 MG: 20 INJECTION, SOLUTION EPIDURAL; INFILTRATION; INTRACAUDAL; PERINEURAL at 13:45

## 2021-05-05 RX ADMIN — Medication 50 MCG: at 13:45

## 2021-05-05 RX ADMIN — MIDAZOLAM 2 MG: 1 INJECTION INTRAMUSCULAR; INTRAVENOUS at 13:40

## 2021-05-05 RX ADMIN — ROCURONIUM BROMIDE 30 MG: 10 INJECTION, SOLUTION INTRAVENOUS at 13:45

## 2021-05-05 RX ADMIN — Medication 50 MCG: at 14:22

## 2021-05-05 RX ADMIN — SUGAMMADEX 200 MG: 100 INJECTION, SOLUTION INTRAVENOUS at 14:17

## 2021-05-05 ASSESSMENT — ENCOUNTER SYMPTOMS
BLOOD IN STOOL: 0
BACK PAIN: 0
EYE DISCHARGE: 0
APNEA: 0
ANAL BLEEDING: 0
PHOTOPHOBIA: 0
ABDOMINAL PAIN: 1
SHORTNESS OF BREATH: 0
COLOR CHANGE: 0
EYE PAIN: 0
ABDOMINAL DISTENTION: 0
CHOKING: 0

## 2021-05-05 ASSESSMENT — PAIN SCALES - GENERAL
PAINLEVEL_OUTOF10: 0

## 2021-05-05 ASSESSMENT — PULMONARY FUNCTION TESTS
PIF_VALUE: 17
PIF_VALUE: 7
PIF_VALUE: 25
PIF_VALUE: 1
PIF_VALUE: 17
PIF_VALUE: 18
PIF_VALUE: 17
PIF_VALUE: 16
PIF_VALUE: 17
PIF_VALUE: 18
PIF_VALUE: 2
PIF_VALUE: 19
PIF_VALUE: 17
PIF_VALUE: 27
PIF_VALUE: 17
PIF_VALUE: 2
PIF_VALUE: 1
PIF_VALUE: 3
PIF_VALUE: 19
PIF_VALUE: 17
PIF_VALUE: 19
PIF_VALUE: 2

## 2021-05-05 ASSESSMENT — LIFESTYLE VARIABLES: SMOKING_STATUS: 1

## 2021-05-05 NOTE — H&P
General Surgery          PATIENT NAME: Karen Cover OF BIRTH: 1980    ADMISSION DATE: 5/5/2021 11:43 AM     Admitting physician: Dr Watson [place]: 5/5/2021    Reason for admission: Umbilical hernia    Chief complaint: Traumatic umbilical hernia    HISTORY OF PRESENT ILLNESS:  The patient is a morbidly obese 41 y.o. female  who presents with traumatic umbilical hernia for repair with mesh. Has had several umbilical and ventral abdominal hernias repaired in the past with recurrences.     Past Medical History:        Diagnosis Date    Anxiety     Depression     GERD (gastroesophageal reflux disease) 02/2020    ON RX    Hernia, ventral     Ventral hernia 2018    repaired x 3 with mesh, has another hernia as of 2020    Wears dentures     UPPER AND LOWER BUT DOES NOT WEAR    Wears glasses        Past Surgical History:        Procedure Laterality Date    CARPAL TUNNEL RELEASE Left 3/17/2021    CARPAL TUNNEL RELEASE performed by Natalia Sullivan MD at [facility], LAPAROSCOPIC N/A 2/10/2020    LAPAROSCOPIC CHOLECYSTECTOMY performed by Carmenza Casey DO at [address] [place] [address]      at age 7   [address] [address]    UMBILICAL WITH MESH    HERNIA REPAIR  2029    UMBILICAL  WITH MESH    HERNIA REPAIR N/A 5/20/2020    XI ROBOTIC LAPAROSCOPIC [address] [place], LYSIS OF ADHESIONS performed by Carmenza Casey DO at [address]  05/20/2020    ROBOTIC LAPAROSCOPIC INCISIONAL HERNIA REPAIR WITH MESH, LYSIS OF ADHESIONS     TONSILLECTOMY      TOOTH EXTRACTION      all teeth including wisdom    TUBAL LIGATION  2005       Medications Prior to Admission:   Medications Prior to Admission: buPROPion (WELLBUTRIN XL) 150 MG extended release tablet, Take 150 mg by mouth every morning  ibuprofen (IBU) 400 MG tablet, Take 1 tablet by mouth every 6 hours as needed for Pain    Allergies:  Adhesive tape and Seasonal    Social History:   Social History     Socioeconomic History    Marital status:      Spouse name: Not on file    Number of children: Not on file    Years of education: Not on file    Highest education level: Not on file   Occupational History    Not on file   Social Needs    Financial resource strain: Not on file    Food insecurity     Worry: Not on file     Inability: Not on file   [employer] needs     Medical: Not on file     Non-medical: Not on file   Tobacco Use    Smoking status: Current Every Day Smoker     Packs/day: 0.50     Years: 25.00     Pack years: 12.50     Types: Cigarettes    Smokeless tobacco: Never Used    Tobacco comment: 3/30/21: cut down to 1/2 ppd, now, from 1 ppd previously. Substance and Sexual Activity    Alcohol use: Yes     Comment: BEER-1 OR 2 A MONTH    Drug use: Never    Sexual activity: Not Currently     Partners: Male   Lifestyle    Physical activity     Days per week: Not on file     Minutes per session: Not on file    Stress: Not on file   Relationships    Social connections     Talks on phone: Not on file     Gets together: Not on file     Attends [employer] service: Not on file     Active member of club or organization: Not on file     Attends meetings of clubs or organizations: Not on file     Relationship status: Not on file    Intimate partner violence     Fear of current or ex partner: Not on file     Emotionally abused: Not on file     Physically abused: Not on file     Forced sexual activity: Not on file   Other Topics Concern    Not on file   Social History Narrative    Not on file       Family History:       Problem Relation Age of Onset    Cancer Maternal Grandmother         LUNG    Cancer Maternal Grandfather         LUNG    Cancer Paternal Grandmother         ?  BRAIN    Cancer Paternal Grandfather         THROAT       REVIEW OF SYSTEMS:    Review of Systems   Constitutional: Negative for activity change, chills and fatigue. HENT: Negative for congestion, drooling and ear pain. Eyes: Negative for photophobia, pain and discharge. Respiratory: Negative for apnea, choking and shortness of breath. Cardiovascular: Negative for chest pain, palpitations and leg swelling. Gastrointestinal: Positive for abdominal pain (Due to abdominal umbilical hernia). Negative for abdominal distention, anal bleeding and blood in stool. Endocrine: Negative for cold intolerance, polydipsia and polyphagia. Genitourinary: Negative for difficulty urinating, dysuria and flank pain. Musculoskeletal: Negative for arthralgias, back pain and gait problem. Skin: Negative for color change, rash and wound. Allergic/Immunologic: Negative for environmental allergies, food allergies and immunocompromised state. Neurological: Negative for dizziness, numbness and headaches. Hematological: Negative for adenopathy. Does not bruise/bleed easily. Psychiatric/Behavioral: Negative for agitation, confusion and hallucinations. PHYSICAL EXAM:    VITALS:  /72   Pulse 91   Temp 97.5 °F (36.4 °C) (Temporal)   Resp 20   Ht 5' 11\" (1.803 m)   Wt 260 lb (117.9 kg)   LMP 04/23/2021   SpO2 96%   BMI 36.26 kg/m²   INTAKE/OUTPUT: .No intake or output data in the 24 hours ending 05/05/21 1247    Physical Exam  Vitals signs and nursing note reviewed. Constitutional:       General: She is not in acute distress. Appearance: Normal appearance. She is obese. HENT:      Head: Normocephalic and atraumatic. Right Ear: Tympanic membrane, ear canal and external ear normal.      Left Ear: Tympanic membrane, ear canal and external ear normal.      Nose: Nose normal.      Mouth/Throat:      Mouth: Mucous membranes are moist.      Pharynx: Oropharynx is clear. No oropharyngeal exudate. Eyes:      General: No scleral icterus. Extraocular Movements: Extraocular movements intact.       Conjunctiva/sclera: Conjunctivae normal. Pupils: Pupils are equal, round, and reactive to light. Neck:      Musculoskeletal: Normal range of motion and neck supple. No neck rigidity. Cardiovascular:      Rate and Rhythm: Normal rate and regular rhythm. Pulses: Normal pulses. Pulmonary:      Effort: Pulmonary effort is normal.      Breath sounds: Normal breath sounds. Abdominal:      General: Abdomen is flat. There is no distension. Palpations: Abdomen is soft. Tenderness: There is no rebound. Hernia: A hernia (Recurrent hernia at umbilicus) is present. Genitourinary:     General: Normal vulva. Vagina: No vaginal discharge. Rectum: Normal.   Musculoskeletal: Normal range of motion. General: No swelling or tenderness. Skin:     General: Skin is warm. Capillary Refill: Capillary refill takes less than 2 seconds. Coloration: Skin is not jaundiced. Neurological:      General: No focal deficit present. Mental Status: She is alert and oriented to person, place, and time. Cranial Nerves: No cranial nerve deficit. Psychiatric:         Mood and Affect: Mood normal.         Behavior: Behavior normal.         Thought Content:  Thought content normal.           CBC with Differential:    Lab Results   Component Value Date    WBC 9.3 05/05/2021    RBC 4.56 05/05/2021    HGB 13.9 05/05/2021    HCT 42.4 05/05/2021     05/05/2021    MCV 93.0 05/05/2021    MCH 30.5 05/05/2021    MCHC 32.8 05/05/2021    RDW 13.0 05/05/2021    LYMPHOPCT 31 05/05/2021    MONOPCT 7 05/05/2021    BASOPCT 1 05/05/2021    MONOSABS 0.60 05/05/2021    LYMPHSABS 2.88 05/05/2021    EOSABS 0.36 05/05/2021    BASOSABS 0.09 05/05/2021    DIFFTYPE NOT REPORTED 05/05/2021     BMP:    Lab Results   Component Value Date     04/02/2021    K 4.0 04/02/2021     04/02/2021    CO2 25 04/02/2021    BUN 7 04/02/2021    LABALBU 3.6 04/02/2021    CREATININE 0.57 04/02/2021    CALCIUM 9.1 04/02/2021    GFRAA >60 04/02/2021 LABGLOM >60 04/02/2021    GLUCOSE 114 04/02/2021       Pertinent Radiology:    No orders to display     None    ASSESSMENT     Recurrent umbilical hernia  PLAN    1. Recurrent umbilical herniorrhaphy with mesh.         Electronically signed by Lety Christian DO  on 5/5/2021 at 12:47 PM

## 2021-05-05 NOTE — ANESTHESIA POSTPROCEDURE EVALUATION
Department of Anesthesiology  Postprocedure Note    Patient: Rawland Schwab  MRN: [id number]  YOB: 1980  Date of evaluation: 5/5/2021  Time:  2:33 PM     Procedure Summary     Date: 05/05/21 Room / Location: [address] [place] [address]    Anesthesia Start: 8237 Anesthesia Stop: 6963    Procedure: HERNIA UMBILICAL REPAIR (N/A Abdomen) Diagnosis: (UMBILICAL HERNIA)    Surgeons: Miguelangel Valentin DO Responsible Provider: Keegan Vásquez MD    Anesthesia Type: general ASA Status: 2          Anesthesia Type: general    John Phase I:      John Phase II:      Last vitals: Reviewed and per EMR flowsheets.        Anesthesia Post Evaluation    Patient location during evaluation: PACU  Patient participation: complete - patient participated  Level of consciousness: awake and alert  Airway patency: patent  Nausea & Vomiting: no vomiting and no nausea  Complications: no  Cardiovascular status: hemodynamically stable  Respiratory status: acceptable  Hydration status: stable

## 2021-05-05 NOTE — OP NOTE
Operative Note      Patient: Teodora Urena  YOB: 1980  MRN: [id number]    Date of Procedure: 5/5/2021    Pre-Op Diagnosis: UMBILICAL HERNIA, recurrent    Post-Op Diagnosis: Recurrent incisional hernia       Procedure: Recurrent incisional herniorrhaphy with mesh    Surgeon(s):  Soumya Barger DO    Assistant:   Surgical Assistant: Arsenio Mckeon    Anesthesia: General, percent Marcaine with epinephrine local infiltration    Estimated Blood Loss (mL): 3ml    Complications: None    Specimens:   ID Type Source Tests Collected by Time Destination   A : UMBILICAL HERNIA [facility] SURGICAL PATHOLOGY Soumya Barger DO 5/5/2021 1402        Implants:  6.4 cm Bard ventraLex mesh      Drains: none    Findings: Recurrent incisional hernia at umbilicus    Detailed Description of Procedure:   After the abdomen was prepped and draped in usual sterile fashion, the patient's old scar just above the level of the umbilicus was excised in an ellipse measuring 4 x 2 cm longitudinally. Underlying recurrent hernia sac was circumferentially excised to the level of the fascia using sharp and blunt dissection and electrocautery for hemostasis throughout the procedure down to the level of the fascia. Sac was then opened distally and excised at the level of the fascia. The 2.5 x 2.5 cm recurrent hernia defect was then repaired with a 6.4 cm Ventralex patch which was placed flush against the peritoneal surface and tabs were sutured to the fascial edge with interrupted sutures of 2-0 Prolene. Fascial edges were reapproximated using interrupted sutures of #1 Prolene. Wound was anesthetized using half percent Marcaine with epinephrine and subcutaneous tissue was closed in layers with interrupted sutures of 3-0 Vicryl and skin was closed using septic or suture of 4-0 Monocryl. Steri-Strips sterile dressing were then based and the patient was taken recovery room in satisfactory addition.   All sponge needle and

## 2021-05-05 NOTE — ANESTHESIA PRE PROCEDURE
Department of Anesthesiology  Preprocedure Note       Name:  Dipak Hilario   Age:  41 y.o.  :  1980                                          MRN:  [id number]         Date:  2021      Surgeon: Lacie Lagunas):  Aníbal Zhang DO    Procedure: Procedure(s): HERNIA UMBILICAL REPAIR    Medications prior to admission:   Prior to Admission medications    Medication Sig Start Date End Date Taking? Authorizing Provider   ibuprofen (IBU) 400 MG tablet Take 1 tablet by mouth every 6 hours as needed for Pain 21   Jesusita Canales MD   buPROPion (WELLBUTRIN XL) 150 MG extended release tablet Take 150 mg by mouth every morning    Historical Provider, MD       Current medications:    No current facility-administered medications for this encounter. Current Outpatient Medications   Medication Sig Dispense Refill    ibuprofen (IBU) 400 MG tablet Take 1 tablet by mouth every 6 hours as needed for Pain 120 tablet 0    buPROPion (WELLBUTRIN XL) 150 MG extended release tablet Take 150 mg by mouth every morning         Allergies:     Allergies   Allergen Reactions    Seasonal        Problem List:    Patient Active Problem List   Diagnosis Code    Cholelithiasis and cholecystitis without obstruction K80.10    Recurrent incisional hernia with incarceration K43.0    Morbid obesity ([facility] [address] E66.01    Recurrent incisional hernia K43.2    Left carpal tunnel syndrome G56.02       Past Medical History:        Diagnosis Date    Anxiety     Depression     GERD (gastroesophageal reflux disease) 2020    ON RX    Hernia, ventral     Ventral hernia 2018    repaired x 3 with mesh, has another hernia as of 2020    Wears dentures     UPPER AND LOWER BUT DOES NOT WEAR    Wears glasses        Past Surgical History:        Procedure Laterality Date    CARPAL TUNNEL RELEASE Left 3/17/2021    CARPAL TUNNEL RELEASE performed by Joseline Sanchez MD at [address] [place] [address], LAPAROSCOPIC N/A 2/10/2020    LAPAROSCOPIC CHOLECYSTECTOMY performed by Sammi Mark DO at [facility]      at age 7   [address]  [address]    UMBILICAL WITH MESH    HERNIA REPAIR  5817    UMBILICAL  WITH MESH    HERNIA REPAIR N/A 5/20/2020    XI ROBOTIC LAPAROSCOPIC INCISIONAL HERNIA REPAIR WITH MESH, LYSIS OF ADHESIONS performed by Sammi Mark DO at [address]  05/20/2020    ROBOTIC LAPAROSCOPIC INCISIONAL HERNIA REPAIR WITH MESH, LYSIS OF ADHESIONS     TONSILLECTOMY      TOOTH EXTRACTION      all teeth including wisdom    TUBAL LIGATION  2005       Social History:    Social History     Tobacco Use    Smoking status: Current Every Day Smoker     Packs/day: 0.50     Years: 25.00     Pack years: 12.50     Types: Cigarettes    Smokeless tobacco: Never Used    Tobacco comment: 3/30/21: cut down to 1/2 ppd, now, from 1 ppd previously. Substance Use Topics    Alcohol use: Yes     Comment: BEER-1 OR 2 A MONTH                                Ready to quit: Not Answered  Counseling given: Not Answered  Comment: 3/30/21: cut down to 1/2 ppd, now, from 1 ppd previously. Vital Signs (Current): There were no vitals filed for this visit.                                            BP Readings from Last 3 Encounters:   04/02/21 112/68   03/17/21 (!) 94/55   03/17/21 108/62       NPO Status:                                                                                 BMI:   Wt Readings from Last 3 Encounters:   04/27/21 265 lb (120.2 kg)   04/02/21 265 lb (120.2 kg)   03/30/21 265 lb (120.2 kg)     There is no height or weight on file to calculate BMI.    CBC:   Lab Results   Component Value Date    WBC 12.4 04/02/2021    RBC 4.30 04/02/2021    HGB 13.5 04/02/2021    HCT 39.5 04/02/2021    MCV 91.8 04/02/2021    RDW 13.7 04/02/2021     04/02/2021       CMP:   Lab Results   Component Value Date     04/02/2021    K 4.0 04/02/2021     04/02/2021 CO2 25 04/02/2021    BUN 7 04/02/2021    CREATININE 0.57 04/02/2021    GFRAA >60 04/02/2021    LABGLOM >60 04/02/2021    GLUCOSE 114 04/02/2021    PROT 6.6 04/02/2021    CALCIUM 9.1 04/02/2021    BILITOT 0.93 04/02/2021    ALKPHOS 67 04/02/2021    AST 17 04/02/2021    ALT 19 04/02/2021       POC Tests: No results for input(s): POCGLU, POCNA, POCK, POCCL, POCBUN, POCHEMO, POCHCT in the last 72 hours. Coags: No results found for: PROTIME, INR, APTT    HCG (If Applicable):   Lab Results   Component Value Date    HCG NEGATIVE 03/17/2021        ABGs: No results found for: PHART, PO2ART, TRM0FHK, LAL0TDF, BEART, Y2UTMGYH     Type & Screen (If Applicable):  No results found for: LABABO, LABRH    Drug/Infectious Status (If Applicable):  No results found for: HIV, HEPCAB    COVID-19 Screening (If Applicable):   Lab Results   Component Value Date    COVID19 Not Detected 05/01/2021    COVID19 Not Detected 05/17/2020           Anesthesia Evaluation  Patient summary reviewed and Nursing notes reviewed no history of anesthetic complications:   Airway: Mallampati: II  TM distance: >3 FB   Neck ROM: full  Mouth opening: > = 3 FB Dental:    (+) edentulous      Pulmonary:normal exam  breath sounds clear to auscultation  (+) current smoker    (-) COPD, asthma and sleep apnea                           Cardiovascular:Negative CV ROS        (-) hypertension, past MI, CAD, CABG/stent, dysrhythmias,  angina and  CHF      Rhythm: regular  Rate: normal                    Neuro/Psych:   (+) depression/anxiety    (-) seizures, TIA and CVA           GI/Hepatic/Renal:   (+) GERD: well controlled,      (-) liver disease and no renal disease       Endo/Other: Negative Endo/Other ROS       (-) diabetes mellitus, hypothyroidism, hyperthyroidism               Abdominal:   (+) obese,         Vascular:                                    Anesthesia Plan      general     ASA 2       Induction: intravenous.     MIPS: Postoperative opioids intended

## 2021-05-07 LAB — SURGICAL PATHOLOGY REPORT: NORMAL

## 2021-05-21 ENCOUNTER — TELEPHONE (OUTPATIENT)
Dept: ORTHOPEDIC SURGERY | Age: 41
End: 2021-05-21

## 2021-05-21 NOTE — TELEPHONE ENCOUNTER
If to [address] the answer is no I did not review prior job;   This should be covered by last job's workmans comp when carpal tunnel developed

## 2021-07-27 ENCOUNTER — OFFICE VISIT (OUTPATIENT)
Dept: ORTHOPEDIC SURGERY | Age: 41
End: 2021-07-27
Payer: MEDICAID

## 2021-07-27 VITALS — BODY MASS INDEX: 37.1 KG/M2 | RESPIRATION RATE: 12 BRPM | HEIGHT: 71 IN | WEIGHT: 265 LBS

## 2021-07-27 DIAGNOSIS — G56.02 CARPAL TUNNEL SYNDROME OF LEFT WRIST: Primary | ICD-10-CM

## 2021-07-27 DIAGNOSIS — M65.312 TRIGGER THUMB, LEFT THUMB: ICD-10-CM

## 2021-07-27 PROCEDURE — 99213 OFFICE O/P EST LOW 20 MIN: CPT | Performed by: ORTHOPAEDIC SURGERY

## 2021-07-27 PROCEDURE — G8427 DOCREV CUR MEDS BY ELIG CLIN: HCPCS | Performed by: ORTHOPAEDIC SURGERY

## 2021-07-27 PROCEDURE — G8417 CALC BMI ABV UP PARAM F/U: HCPCS | Performed by: ORTHOPAEDIC SURGERY

## 2021-07-27 PROCEDURE — 20550 NJX 1 TENDON SHEATH/LIGAMENT: CPT | Performed by: ORTHOPAEDIC SURGERY

## 2021-07-27 PROCEDURE — 4004F PT TOBACCO SCREEN RCVD TLK: CPT | Performed by: ORTHOPAEDIC SURGERY

## 2021-07-27 RX ORDER — TRIAMCINOLONE ACETONIDE 40 MG/ML
40 INJECTION, SUSPENSION INTRA-ARTICULAR; INTRAMUSCULAR ONCE
Status: COMPLETED | OUTPATIENT
Start: 2021-07-27 | End: 2021-07-27

## 2021-07-27 RX ORDER — LIDOCAINE HYDROCHLORIDE 10 MG/ML
1 INJECTION, SOLUTION INFILTRATION; PERINEURAL ONCE
Status: COMPLETED | OUTPATIENT
Start: 2021-07-27 | End: 2021-07-27

## 2021-07-27 RX ADMIN — LIDOCAINE HYDROCHLORIDE 1 ML: 10 INJECTION, SOLUTION INFILTRATION; PERINEURAL at 09:09

## 2021-07-27 RX ADMIN — TRIAMCINOLONE ACETONIDE 40 MG: 40 INJECTION, SUSPENSION INTRA-ARTICULAR; INTRAMUSCULAR at 09:10

## 2021-07-27 NOTE — PROGRESS NOTES
Patient ID: Tino Anaya is a 35 y.o. female    Chief Compliant:  Chief Complaint   Patient presents with    Post-Op Check     left carpal tunnel release 3/17/21        HPI:  This is a 35 y.o. female who presents to the clinic today for left hand evaluation. Patient notes new-onset left trigger thumb    She has a history of tendonitis in her left hand     She has a history of left carpal tunnel release on 03/17/2021      Review of Systems   All other systems reviewed and are negative. Past History:    Current Outpatient Medications:     ibuprofen (ADVIL;MOTRIN) 800 MG tablet, Take 1 tablet by mouth every 8 hours as needed for Pain, Disp: 120 tablet, Rfl: 3    buPROPion (WELLBUTRIN XL) 150 MG extended release tablet, Take 150 mg by mouth every morning, Disp: , Rfl:   Allergies   Allergen Reactions    Adhesive Tape Rash    Seasonal      Social History     Socioeconomic History    Marital status:      Spouse name: Not on file    Number of children: Not on file    Years of education: Not on file    Highest education level: Not on file   Occupational History    Not on file   Tobacco Use    Smoking status: Current Every Day Smoker     Packs/day: 0.50     Years: 25.00     Pack years: 12.50     Types: Cigarettes    Smokeless tobacco: Never Used    Tobacco comment: 3/30/21: cut down to 1/2 ppd, now, from 1 ppd previously. Vaping Use    Vaping Use: Never used   Substance and Sexual Activity    Alcohol use: Yes     Comment: BEER-1 OR 2 A MONTH    Drug use: Never    Sexual activity: Not Currently     Partners: Male   Other Topics Concern    Not on file   Social History Narrative    Not on file     Social Determinants of Health     Financial Resource Strain:     Difficulty of Paying Living Expenses:    Food Insecurity:     Worried About Running Out of Food in the Last Year:     [address] in the Last Year:    Transportation Needs:     Lack of Transportation (Medical):      Lack of Transportation (Non-Medical):    Physical Activity:     Days of Exercise per Week:     Minutes of Exercise per Session:    Stress:     Feeling of Stress :    Social Connections:     Frequency of Communication with Friends and Family:     Frequency of Social Gatherings with Friends and Family:     Attends Jew Services:     Active Member of Clubs or Organizations:     Attends Club or Organization Meetings:     Marital Status:    Intimate Partner Violence:     Fear of Current or Ex-Partner:     Emotionally Abused:     Physically Abused:     Sexually Abused:      Past Medical History:   Diagnosis Date    Anxiety     Depression     GERD (gastroesophageal reflux disease) 02/2020    ON RX    Hernia, ventral     Ventral hernia 2018    repaired x 3 with mesh, has another hernia as of 2020    Wears dentures     UPPER AND LOWER BUT DOES NOT WEAR    Wears glasses      Past Surgical History:   Procedure Laterality Date    CARPAL TUNNEL RELEASE Left 3/17/2021    CARPAL TUNNEL RELEASE performed by Yennifer Saba MD at [address] [place] [address], LAPAROSCOPIC N/A 2/10/2020    LAPAROSCOPIC CHOLECYSTECTOMY performed by Essie Mccormack DO at [address] [facility]      at age 7   [address]  2753    UMBILICAL  WITH [address] [place] N/A 5/20/2020    XI ROBOTIC [address] [facility], LYSIS OF ADHESIONS performed by Essie Mccormack DO at [address]  05/20/2020    ROBOTIC [address] [facility], LYSIS OF ADHESIONS     TONSILLECTOMY      TOOTH EXTRACTION      all teeth including wisdom    TUBAL LIGATION  9904    UMBILICAL HERNIA REPAIR N/A 1/4/0178    HERNIA UMBILICAL REPAIR performed by Essie Mccormack DO at [facility] OR     Family History   Problem Relation Age of Onset    Cancer Maternal Grandmother         LUNG    Cancer Maternal Grandfather         LUNG    Cancer Paternal Grandmother         ? BRAIN    Cancer Paternal Grandfather         THROAT        Physical Exam:  Vitals signs and nursing note reviewed. Constitutional:       Appearance: She is well-developed. HENT:      Head: Normocephalic and atraumatic. Nose: Nose normal.   Eyes:      Conjunctiva/sclera: Conjunctivae normal.   Neck:      Musculoskeletal: Normal range of motion and neck supple. Pulmonary:      Effort: Pulmonary effort is normal. No respiratory distress. Musculoskeletal:      Comments: Normal gait     Skin:     General: Skin is warm and dry. Neurological:      Mental Status: She is alert and oriented to person, place, and time. Sensory: No sensory deficit. Psychiatric:         Behavior: Behavior normal.         Thought Content: Thought content normal.    Physical exam lightly triggering left thumb    Diagnostic imaging:        Assessment and Plan:  1. Carpal tunnel syndrome of left wrist    2. Trigger thumb, left thumb      Left trigger thumb injected    Follow up in 3 weeks   An informed verbal consent for the procedure was obtained and risks including, but not limited to: allergy to medications, injection, bleeding, stiffness of joint, recurrence of symptoms, loss of function, swelling, drainage, irrigation, need for surgery and pseudo-septic inflammation, were explained to the patient. Also, discussed was the potential for further injections, irrigation and debridement and surgery. Alternate means of treatment have also been discussed with the patient. An informed verbal consent for the procedure was obtained and risks including, but not limited to: allergy to medications, injection, bleeding, stiffness of joint, recurrence of symptoms, loss of function, swelling, drainage, irrigation, need for surgery and pseudo-septic inflammation, were explained to the patient.  Also, discussed was the potential for further injections, irrigation and debridement and surgery. Alternate means of treatment have also been discussed with the patient. Administrations This Visit     lidocaine 1 % injection 1 mL     Admin Date  07/27/2021  09:09 Action  Given Dose  1 mL Route  Intra-articular Site  Finger (See Comments) Administered By  Ibeth Soliz LPN    Ordering Provider: Fredy Helms MD    NDC: 9468-8837-59    Lot#: [id number]    : [address] [facility]    Patient Supplied?: No    Comments: left thumb          triamcinolone acetonide (KENALOG-40) injection 40 mg     Admin Date  07/27/2021  09:10 Action  Given Dose  40 mg Route  Intra-articular Site  Finger (See Comments) Administered By  Ibeth Soliz LPN    Ordering Provider: Fredy Helms MD    NDC: 6468-8215-96    Lot#: [id number]    : [employer] [email or web address] [place] [facility])    Patient Supplied?: No    Comments: left thumb              Drop box above inappropriately states this is an intra-articular injection. That was incorrect. Should state it was a tendon injection. Provider Attestation:  Evie Merritt, personally performed the services described in this documentation. All medical record entries made by the scribe were at my direction and in my presence. I have reviewed the chart and discharge instructions and agree that the records reflect my personal performance and is accurate and complete. Nicole Fabian MD 7/27/21     Scribe Attestation:  By signing my name below, Pepe London, attest that this documentation has been prepared under the direction and in the presence of Dr. Rose Tidwell. Electronically signed: Miles Andersen, 7/27/21     Please note that this chart was generated using voice recognition Dragon dictation software. Although every effort was made to ensure the accuracy of this automated transcription, some errors in transcription may have occurred.

## 2021-08-24 ENCOUNTER — OFFICE VISIT (OUTPATIENT)
Dept: ORTHOPEDIC SURGERY | Age: 41
End: 2021-08-24
Payer: MEDICAID

## 2021-08-24 VITALS — HEIGHT: 71 IN | WEIGHT: 265 LBS | RESPIRATION RATE: 12 BRPM | BODY MASS INDEX: 37.1 KG/M2

## 2021-08-24 DIAGNOSIS — G56.02 CARPAL TUNNEL SYNDROME OF LEFT WRIST: ICD-10-CM

## 2021-08-24 DIAGNOSIS — M65.312 TRIGGER THUMB, LEFT THUMB: Primary | ICD-10-CM

## 2021-08-24 DIAGNOSIS — M65.4 DE QUERVAIN'S DISEASE (RADIAL STYLOID TENOSYNOVITIS): ICD-10-CM

## 2021-08-24 PROCEDURE — 20550 NJX 1 TENDON SHEATH/LIGAMENT: CPT | Performed by: ORTHOPAEDIC SURGERY

## 2021-08-24 RX ORDER — BETAMETHASONE SODIUM PHOSPHATE AND BETAMETHASONE ACETATE 3; 3 MG/ML; MG/ML
12 INJECTION, SUSPENSION INTRA-ARTICULAR; INTRALESIONAL; INTRAMUSCULAR; SOFT TISSUE ONCE
Status: COMPLETED | OUTPATIENT
Start: 2021-08-24 | End: 2021-08-24

## 2021-08-24 RX ORDER — LIDOCAINE HYDROCHLORIDE 10 MG/ML
1 INJECTION, SOLUTION INFILTRATION; PERINEURAL ONCE
Status: COMPLETED | OUTPATIENT
Start: 2021-08-24 | End: 2021-08-24

## 2021-08-24 RX ADMIN — BETAMETHASONE SODIUM PHOSPHATE AND BETAMETHASONE ACETATE 12 MG: 3; 3 INJECTION, SUSPENSION INTRA-ARTICULAR; INTRALESIONAL; INTRAMUSCULAR; SOFT TISSUE at 09:22

## 2021-08-24 RX ADMIN — LIDOCAINE HYDROCHLORIDE 1 ML: 10 INJECTION, SOLUTION INFILTRATION; PERINEURAL at 09:23

## 2021-08-24 NOTE — PROGRESS NOTES
Patient ID: German Valencia is a 35 y.o. female    Chief Compliant:  No chief complaint on file. Diagnostic imaging:        Assessment and Plan:  1. Carpal tunnel syndrome of left wrist    2. De Quervain's disease (radial styloid tenosynovitis)    3. Trigger thumb, left thumb        Patient currently only symptomatic from left trigger thumb improved post injection but not excellent    Left trigger thumb injection provided    We will consider left trigger thumb release if she does not improve    Follow up in 4 weeks    An informed verbal consent for the procedure was obtained and risks including, but not limited to: allergy to medications, injection, bleeding, stiffness of joint, recurrence of symptoms, loss of function, swelling, drainage, irrigation, need for surgery and pseudo-septic inflammation, were explained to the patient. Also, discussed was the potential for further injections, irrigation and debridement and surgery. Alternate means of treatment have also been discussed with the patient. Administrations This Visit     betamethasone acetate-betamethasone sodium phosphate (CELESTONE) injection 12 mg     Admin Date  08/24/2021  09:22 Action  Given Dose  12 mg Route  Intra-articular Site  Wrist Left Administered By  Elvira Davis LPN    Ordering Provider: Vinay Payne MD    NDC: 0892-6439-34    Lot#: [id number]    : [address] [facility]    Patient Supplied?: No    Comments: right thumb          lidocaine 1 % injection 1 mL     Admin Date  08/24/2021  09:23 Action  Given Dose  1 mL Route  Intra-articular Site  Wrist Right Administered By  Elvira Davis LPN    Ordering Provider: Vinay Payne MD    NDC: 3527-0643-39    Lot#: [id number]    : [id number] [facility]    Patient Supplied?: No    Comments: right thumb                  HPI:  This is a 35 y.o. female who presents to the clinic today for left trigger thumb.      Patient notes modrate relief of left trigger thumb following 1st trigger finger injection. She continues to have left thumb triggering and tenderness, about 70% improved compared to the last visit. Review of Systems   All other systems reviewed and are negative. Past History:    Current Outpatient Medications:     ibuprofen (ADVIL;MOTRIN) 800 MG tablet, Take 1 tablet by mouth every 8 hours as needed for Pain, Disp: 120 tablet, Rfl: 3    buPROPion (WELLBUTRIN XL) 150 MG extended release tablet, Take 150 mg by mouth every morning, Disp: , Rfl:   Allergies   Allergen Reactions    Adhesive Tape Rash    Seasonal      Social History     Socioeconomic History    Marital status:      Spouse name: Not on file    Number of children: Not on file    Years of education: Not on file    Highest education level: Not on file   Occupational History    Not on file   Tobacco Use    Smoking status: Current Every Day Smoker     Packs/day: 0.50     Years: 25.00     Pack years: 12.50     Types: Cigarettes    Smokeless tobacco: Never Used    Tobacco comment: 3/30/21: cut down to 1/2 ppd, now, from 1 ppd previously. Vaping Use    Vaping Use: Never used   Substance and Sexual Activity    Alcohol use: Yes     Comment: BEER-1 OR 2 A MONTH    Drug use: Never    Sexual activity: Not Currently     Partners: Male   Other Topics Concern    Not on file   Social History Narrative    Not on file     Social Determinants of Health     Financial Resource Strain:     Difficulty of Paying Living Expenses:    Food Insecurity:     Worried About Running Out of Food in the Last Year:     [address] in the Last Year:    Transportation Needs:     Lack of Transportation (Medical):      Lack of Transportation (Non-Medical):    Physical Activity:     Days of Exercise per Week:     Minutes of Exercise per Session:    Stress:     Feeling of Stress :    Social Connections:     Frequency of Communication with Friends and Family:     Frequency of Social Gatherings with Friends and Family:  Attends Jain Services:     Active Member of Clubs or Organizations:     Attends Club or Organization Meetings:     Marital Status:    Intimate Partner Violence:     Fear of Current or Ex-Partner:     Emotionally Abused:     Physically Abused:     Sexually Abused:      Past Medical History:   Diagnosis Date    Anxiety     Depression     GERD (gastroesophageal reflux disease) 02/2020    ON RX    Hernia, ventral     Ventral hernia 2018    repaired x 3 with mesh, has another hernia as of 2020    Wears dentures     UPPER AND LOWER BUT DOES NOT WEAR    Wears glasses      Past Surgical History:   Procedure Laterality Date    CARPAL TUNNEL RELEASE Left 3/17/2021    CARPAL TUNNEL RELEASE performed by Mohinder Ferguson MD at [address], LAPAROSCOPIC N/A 2/10/2020    LAPAROSCOPIC CHOLECYSTECTOMY performed by Mceknna Aguirre DO at [facility]      at age 7   [address] [facility]  [address]    UMBILICAL WITH MESH    HERNIA REPAIR  6404    UMBILICAL  WITH MESH    HERNIA REPAIR N/A 5/20/2020    XI ROBOTIC LAPAROSCOPIC [address] [place], LYSIS OF ADHESIONS performed by Mckenna Aguirre DO at [address]  05/20/2020    ROBOTIC [address] [facility], LYSIS OF ADHESIONS     TONSILLECTOMY      TOOTH EXTRACTION      all teeth including wisdom    TUBAL LIGATION  0957    UMBILICAL HERNIA REPAIR N/A 8/9/5169    HERNIA UMBILICAL REPAIR performed by Mckenna Aguirre DO at [address] [facility] History   Problem Relation Age of Onset    Cancer Maternal Grandmother         LUNG    Cancer Maternal Grandfather         LUNG    Cancer Paternal Grandmother         ? BRAIN    Cancer Paternal Grandfather         THROAT        Physical Exam:  Vitals signs and nursing note reviewed. Constitutional:       Appearance: well-developed. HENT:      Head: Normocephalic and atraumatic. Nose: Nose normal.   Eyes:      Conjunctiva/sclera: Conjunctivae normal.   Neck:      Musculoskeletal: Normal range of motion and neck supple. Pulmonary:      Effort: Pulmonary effort is normal. No respiratory distress. Musculoskeletal:      Comments: Normal gait     Skin:     General: Skin is warm and dry. Neurological:      Mental Status: Alert and oriented to person, place, and time. Sensory: No sensory deficit. Psychiatric:         Behavior: Behavior normal.         Thought Content: Thought content normal.    Physical exam reveals 10 degree lack of extension at mP joint thumb tenderness over the A1 pulley    Provider Attestation:  Sheri Merritt, personally performed the services described in this documentation. All medical record entries made by the scribe were at my direction and in my presence. I have reviewed the chart and discharge instructions and agree that the records reflect my personal performance and is accurate and complete. Fany Alejo MD 8/24/21     Scribe Attestation:  By signing my name below, Kusum Yasir, attest that this documentation has been prepared under the direction and in the presence of Dr. Emerita Peck. Electronically signed: Miles Boone, 8/24/21     Please note that this chart was generated using voice recognition Dragon dictation software. Although every effort was made to ensure the accuracy of this automated transcription, some errors in transcription may have occurred.

## 2021-09-21 ENCOUNTER — OFFICE VISIT (OUTPATIENT)
Dept: ORTHOPEDIC SURGERY | Age: 41
End: 2021-09-21
Payer: MEDICAID

## 2021-09-21 VITALS — RESPIRATION RATE: 16 BRPM | BODY MASS INDEX: 37.1 KG/M2 | HEIGHT: 71 IN | WEIGHT: 265 LBS

## 2021-09-21 DIAGNOSIS — M65.4 DE QUERVAIN'S DISEASE (RADIAL STYLOID TENOSYNOVITIS): ICD-10-CM

## 2021-09-21 DIAGNOSIS — M65.312 TRIGGER THUMB, LEFT THUMB: Primary | ICD-10-CM

## 2021-09-21 PROCEDURE — 20550 NJX 1 TENDON SHEATH/LIGAMENT: CPT | Performed by: ORTHOPAEDIC SURGERY

## 2021-09-21 PROCEDURE — G8427 DOCREV CUR MEDS BY ELIG CLIN: HCPCS | Performed by: ORTHOPAEDIC SURGERY

## 2021-09-21 PROCEDURE — G8417 CALC BMI ABV UP PARAM F/U: HCPCS | Performed by: ORTHOPAEDIC SURGERY

## 2021-09-21 PROCEDURE — 99213 OFFICE O/P EST LOW 20 MIN: CPT | Performed by: ORTHOPAEDIC SURGERY

## 2021-09-21 PROCEDURE — 4004F PT TOBACCO SCREEN RCVD TLK: CPT | Performed by: ORTHOPAEDIC SURGERY

## 2021-09-21 RX ORDER — LIDOCAINE HYDROCHLORIDE 10 MG/ML
1 INJECTION, SOLUTION INFILTRATION; PERINEURAL ONCE
Status: COMPLETED | OUTPATIENT
Start: 2021-09-21 | End: 2021-09-21

## 2021-09-21 RX ORDER — BETAMETHASONE SODIUM PHOSPHATE AND BETAMETHASONE ACETATE 3; 3 MG/ML; MG/ML
6 INJECTION, SUSPENSION INTRA-ARTICULAR; INTRALESIONAL; INTRAMUSCULAR; SOFT TISSUE ONCE
Status: COMPLETED | OUTPATIENT
Start: 2021-09-21 | End: 2021-09-21

## 2021-09-21 RX ADMIN — LIDOCAINE HYDROCHLORIDE 1 ML: 10 INJECTION, SOLUTION INFILTRATION; PERINEURAL at 08:52

## 2021-09-21 RX ADMIN — BETAMETHASONE SODIUM PHOSPHATE AND BETAMETHASONE ACETATE 6 MG: 3; 3 INJECTION, SUSPENSION INTRA-ARTICULAR; INTRALESIONAL; INTRAMUSCULAR; SOFT TISSUE at 08:51

## 2021-09-21 NOTE — PROGRESS NOTES
Patient ID: Jonathan Calixto is a 35 y.o. female    Chief Compliant:  No chief complaint on file. Diagnostic imaging:        Assessment and Plan:  1. De Quervain's disease (radial styloid tenosynovitis)    2. Trigger thumb, left thumb      3rd left trigger thumb injection provided     Consider left trigger thumb release if symptoms worsen or do not improve    Follow up prn    An informed verbal consent for the procedure was obtained and risks including, but not limited to: allergy to medications, injection, bleeding, stiffness of joint, recurrence of symptoms, loss of function, swelling, drainage, irrigation, need for surgery and pseudo-septic inflammation, were explained to the patient. Also, discussed was the potential for further injections, irrigation and debridement and surgery. Alternate means of treatment have also been discussed with the patient. Administrations This Visit     betamethasone acetate-betamethasone sodium phosphate (CELESTONE) injection 6 mg     Admin Date  09/21/2021  08:51 Action  Given Dose  6 mg Route  Intra-articular Site  Finger (See Comments) Administered By  Veronique Hinojosa LPN    Ordering Provider: Destiny Valdez MD    NDC: 3881-3057-47    Lot#: [id number]    : [employer]    Patient Supplied?: No    Comments: left thumb          lidocaine 1 % injection 1 mL     Admin Date  09/21/2021  08:52 Action  Given Dose  1 mL Route  Intra-articular Site  Finger (See Comments) Administered By  Veronique Hinojosa LPN    Ordering Provider: Destiny Valdez MD    NDC: 5065-1197-10    Lot#: [id number]    : [address] [facility]    Patient Supplied?: No    Comments: left thumb              Intra-articular injection noted above is incorrect this was a tendon injection    HPI:  This is a 35 y.o. female who presents to the clinic today for left trigger thumb. Patient notes good improvement since second left trigger thumb injection.  Her thumb has only triggered once in the last 4 weeks    Review of Systems   All other systems reviewed and are negative. Past History:    Current Outpatient Medications:     ibuprofen (ADVIL;MOTRIN) 800 MG tablet, Take 1 tablet by mouth every 8 hours as needed for Pain, Disp: 120 tablet, Rfl: 3    buPROPion (WELLBUTRIN XL) 150 MG extended release tablet, Take 150 mg by mouth every morning, Disp: , Rfl:   Allergies   Allergen Reactions    Adhesive Tape Rash    Seasonal      Social History     Socioeconomic History    Marital status:      Spouse name: Not on file    Number of children: Not on file    Years of education: Not on file    Highest education level: Not on file   Occupational History    Not on file   Tobacco Use    Smoking status: Current Every Day Smoker     Packs/day: 0.50     Years: 25.00     Pack years: 12.50     Types: Cigarettes    Smokeless tobacco: Never Used    Tobacco comment: 3/30/21: cut down to 1/2 ppd, now, from 1 ppd previously. Vaping Use    Vaping Use: Never used   Substance and Sexual Activity    Alcohol use: Yes     Comment: BEER-1 OR 2 A MONTH    Drug use: Never    Sexual activity: Not Currently     Partners: Male   Other Topics Concern    Not on file   Social History Narrative    Not on file     Social Determinants of Health     Financial Resource Strain:     Difficulty of Paying Living Expenses:    Food Insecurity:     Worried About Running Out of Food in the Last Year:     [address] in the Last Year:    Transportation Needs:     Lack of Transportation (Medical):      Lack of Transportation (Non-Medical):    Physical Activity:     Days of Exercise per Week:     Minutes of Exercise per Session:    Stress:     Feeling of Stress :    Social Connections:     Frequency of Communication with Friends and Family:     Frequency of Social Gatherings with Friends and Family:     Attends Mandaeism Services:     Active Member of Clubs or Organizations:     Attends Club or Organization Meetings:  Marital Status:    Intimate Partner Violence:     Fear of Current or Ex-Partner:     Emotionally Abused:     Physically Abused:     Sexually Abused:      Past Medical History:   Diagnosis Date    Anxiety     Depression     GERD (gastroesophageal reflux disease) 02/2020    ON RX    Hernia, ventral     Ventral hernia 2018    repaired x 3 with mesh, has another hernia as of 2020    Wears dentures     UPPER AND LOWER BUT DOES NOT WEAR    Wears glasses      Past Surgical History:   Procedure Laterality Date    CARPAL TUNNEL RELEASE Left 3/17/2021    CARPAL TUNNEL RELEASE performed by Vonnie Barr MD at [facility], LAPAROSCOPIC N/A 2/10/2020    LAPAROSCOPIC CHOLECYSTECTOMY performed by Yuni Nye DO at [facility]      at age 7   [address]  [address]    UMBILICAL WITH MESH    HERNIA REPAIR  5395    UMBILICAL  WITH MESH    HERNIA REPAIR N/A 5/20/2020    XI ROBOTIC LAPAROSCOPIC [address] [place], LYSIS OF ADHESIONS performed by Yuni Nye DO at [address]  05/20/2020    ROBOTIC [facility], LYSIS OF ADHESIONS     TONSILLECTOMY      TOOTH EXTRACTION      all teeth including wisdom    TUBAL LIGATION  1120    UMBILICAL HERNIA REPAIR N/A 3/6/8036    HERNIA UMBILICAL REPAIR performed by Yuni Nye DO at [facility] History   Problem Relation Age of Onset    Cancer Maternal Grandmother         LUNG    Cancer Maternal Grandfather         LUNG    Cancer Paternal Grandmother         ? BRAIN    Cancer Paternal Grandfather         THROAT        Physical Exam:  Vitals signs and nursing note reviewed. Constitutional:       Appearance: well-developed. HENT:      Head: Normocephalic and atraumatic.       Nose: Nose normal.   Eyes:      Conjunctiva/sclera: Conjunctivae normal.   Neck:      Musculoskeletal: Normal range of motion and neck supple. Pulmonary:      Effort: Pulmonary effort is normal. No respiratory distress. Musculoskeletal:      Comments: Normal gait     Skin:     General: Skin is warm and dry. Neurological:      Mental Status: Alert and oriented to person, place, and time. Sensory: No sensory deficit. Psychiatric:         Behavior: Behavior normal.         Thought Content: Thought content normal.        Provider Attestation:  Merry Merritt, personally performed the services described in this documentation. All medical record entries made by the scribe were at my direction and in my presence. I have reviewed the chart and discharge instructions and agree that the records reflect my personal performance and is accurate and complete. Nena Christie MD 9/21/21     Scribe Attestation:  By signing my name below, Adriana Samuels, attest that this documentation has been prepared under the direction and in the presence of Dr. Yvon Nieves. Electronically signed: Miles Alvarado, 9/21/21     Please note that this chart was generated using voice recognition Dragon dictation software. Although every effort was made to ensure the accuracy of this automated transcription, some errors in transcription may have occurred.

## 2022-01-03 ENCOUNTER — HOSPITAL ENCOUNTER (EMERGENCY)
Age: 42
Discharge: HOME | End: 2022-01-03
Attending: STUDENT IN AN ORGANIZED HEALTH CARE EDUCATION/TRAINING PROGRAM
Payer: MEDICAID

## 2022-01-03 VITALS
TEMPERATURE: 98.4 F | BODY MASS INDEX: 40.46 KG/M2 | DIASTOLIC BLOOD PRESSURE: 81 MMHG | OXYGEN SATURATION: 97 % | SYSTOLIC BLOOD PRESSURE: 120 MMHG | HEIGHT: 71 IN | WEIGHT: 289 LBS | RESPIRATION RATE: 18 BRPM | HEART RATE: 106 BPM

## 2022-01-03 DIAGNOSIS — H65.02 NON-RECURRENT ACUTE SEROUS OTITIS MEDIA OF LEFT EAR: Primary | ICD-10-CM

## 2022-01-03 PROCEDURE — 99283 EMERGENCY DEPT VISIT LOW MDM: CPT

## 2022-01-03 RX ORDER — AMOXICILLIN 500 MG/1
500 CAPSULE ORAL 2 TIMES DAILY
Qty: 20 CAPSULE | Refills: 0 | Status: SHIPPED | OUTPATIENT
Start: 2022-01-03 | End: 2022-01-13

## 2022-01-03 ASSESSMENT — PAIN SCALES - GENERAL: PAINLEVEL_OUTOF10: 5

## 2022-01-03 ASSESSMENT — PAIN DESCRIPTION - LOCATION: LOCATION: EAR

## 2022-01-03 ASSESSMENT — PAIN DESCRIPTION - PAIN TYPE: TYPE: ACUTE PAIN

## 2022-01-03 NOTE — ED PROVIDER NOTES
[address]  eMERGENCY dEPARTMENT eNCOUnter      Pt Name: Kaitlyn Ragsdale  MRN: [id number]  [place] 1980  Date of evaluation: 1/3/2022  Provider: Félix Rogers PA-C    CHIEF COMPLAINT       Chief Complaint   Patient presents with    Otalgia           HISTORY OF PRESENT ILLNESS  (Location/Symptom, Timing/Onset, Context/Setting, Quality, Duration, Modifying Factors, Severity.)   Kiatlyn Ragsdale is a 35 y.o. female who presents to the emergency department for evaluation of left ear pain. Pt states symptoms began yesterday. Reports muffled hearing. Denies headache, dizziness, congestion, sore throat, cough, chest pain, sob, n/v/abd pain. Pt has no other complaints. Nursing Notes were reviewed. REVIEW OF SYSTEMS    (2-9 systems for level 4, 10 or more for level 5)     Review of Systems   Ear pain       Except as noted above the remainder of the review of systems was reviewed and negative.        PAST MEDICAL HISTORY     Past Medical History:   Diagnosis Date    Anxiety     Depression     GERD (gastroesophageal reflux disease) 02/2020    ON RX    Hernia, ventral     Ventral hernia 2018    repaired x 3 with mesh, has another hernia as of 2020    Wears dentures     UPPER AND LOWER BUT DOES NOT WEAR    Wears glasses      None otherwise stated in nurses notes    SURGICAL HISTORY       Past Surgical History:   Procedure Laterality Date    CARPAL TUNNEL RELEASE Left 3/17/2021    CARPAL TUNNEL RELEASE performed by Shelby Sauceda MD at [facility] , LAPAROSCOPIC N/A 2/10/2020    LAPAROSCOPIC CHOLECYSTECTOMY performed by Lisha Frias DO at [facility]      at age 7   [address]  [address]    UMBILICAL WITH MESH    HERNIA REPAIR  6685    UMBILICAL  WITH MESH    HERNIA REPAIR N/A 5/20/2020    XI ROBOTIC LAPAROSCOPIC [address] [place], LYSIS OF ADHESIONS performed by Lisha Frias DO at [facility] INCISIONAL HERNIA REPAIR  2020    ROBOTIC LAPAROSCOPIC INCISIONAL HERNIA REPAIR WITH MESH, LYSIS OF ADHESIONS     TONSILLECTOMY      TOOTH EXTRACTION      all teeth including wisdom    TUBAL LIGATION  7000    UMBILICAL HERNIA REPAIR N/A 3894    HERNIA UMBILICAL REPAIR performed by Carmenza Casey DO at [address] [place] [address]     None otherwise stated in nurses notes    CURRENT MEDICATIONS       Previous Medications    BUPROPION (WELLBUTRIN XL) 150 MG EXTENDED RELEASE TABLET    Take 150 mg by mouth every morning    IBUPROFEN (ADVIL;MOTRIN) 800 MG TABLET    Take 1 tablet by mouth every 8 hours as needed for Pain       ALLERGIES     Adhesive tape and Seasonal    FAMILY HISTORY           Problem Relation Age of Onset    Cancer Maternal Grandmother         LUNG    Cancer Maternal Grandfather         LUNG    Cancer Paternal Grandmother         ? BRAIN    Cancer Paternal Grandfather         THROAT     Family Status   Relation Name Status    Mother  Alive    Father  Alive    Brother Fei Holland    MGM      MGF      PGM      PGF      Brother LIGIA Alive      None otherwise stated in nurses notes    SOCIAL HISTORY      reports that she has been smoking cigarettes. She has a 12.50 pack-year smoking history. She has never used smokeless tobacco. She reports current alcohol use. She reports that she does not use drugs. lives at home with others     PHYSICAL EXAM    (up to 7 for level 4, 8 or more for level 5)     ED Triage Vitals   BP Temp Temp Source Pulse Resp SpO2 Height Weight   22 1347 22 1347 22 1347 22 1347 22 1347 -- 22 1343 22 1343   120/81 98.4 °F (36.9 °C) Oral 106 18  5' 11\" (1.803 m) 289 lb (131.1 kg)       Physical Exam   Nursing note and vitals reviewed. Constitutional: Oriented to person, place, and time and well-developed, well-nourished. Head: Normocephalic and atraumatic.    Ear: External ears normal. Examination of left ear reveals erythematous TM. There is no perforation, canal erythema or swelling. No drainage. No mastoid tenderness. Nose: Nose normal and midline. Eyes: Conjunctivae and EOM are normal. Pupils are equal, round, and reactive to light. Neck: Normal range of motion. Neck supple. Throat: mask not removed due to pandemic. Cardiovascular: Normal rate, regular rhythm, normal heart sounds and intact distal pulses. Pulmonary/Chest: Effort normal and breath sounds normal. No respiratory distress. No wheezes. No rales. No chest tenderness. Musculoskeletal: Normal range of motion. Neurological: Alert and oriented to person, place, and time. GCS score is 15. Skin: Skin is warm and dry. No rash noted. No erythema. No pallor. Psychiatric: Mood, memory, affect and judgment normal.           DIAGNOSTIC RESULTS     EKG: All EKG's are interpreted by the Emergency Department Physician who either signs or Co-signs this chart in the absence of a cardiologist.        RADIOLOGY:   All plain film, CT, MRI, and formal ultrasound images (except ED bedside ultrasound) are read by the radiologist, see reports below, unless otherwise noted in MDM or here. No orders to display       No results found. LABS:  Labs Reviewed - No data to display    All other labs were within normal range or not returned as of this dictation.     EMERGENCY DEPARTMENT COURSE and DIFFERENTIAL DIAGNOSIS/MDM:   Vitals:    Vitals:    01/03/22 1343 01/03/22 1347   BP:  120/81   Pulse:  106   Resp:  18   Temp:  98.4 °F (36.9 °C)   TempSrc:  Oral   SpO2: 97%    Weight: 289 lb (131.1 kg)    Height: 5' 11\" (1.803 m)          Patient instructed to return to the emergency room if symptoms worsen, return, or any other concern right away which is agreed by the patient    ED MEDS:  Orders Placed This Encounter   Medications    amoxicillin (AMOXIL) 500 MG capsule     Sig: Take 1 capsule by mouth 2 times daily for 10 days     Dispense:  20 capsule     Refill:  0         CONSULTS:  None    PROCEDURES:  None      FINAL IMPRESSION      1. Non-recurrent acute serous otitis media of left ear          DISPOSITION/PLAN   DISPOSITION Decision To Discharge    PATIENT REFERRED TO:  Pina Arceo   [address] [place] [address] ED  Florin Winter [address]  [phone]          DISCHARGE MEDICATIONS:  New Prescriptions    AMOXICILLIN (AMOXIL) 500 MG CAPSULE    Take 1 capsule by mouth 2 times daily for 10 days         Summation      Patient Course:  Left otitis media. No perforation or otitis externa. No other complaints. Will start on amoxicillin. Discussed results and plan with the pt. They expressed appropriate understanding. Pt given close follow up, supportive care instructions and strict return instructions at the bedside. The care is provided during an unprecedented national emergency due to the novel coronavirus, COVID-19. ED Medications administered this visit:  Medications - No data to display    New Prescriptions from this visit:    New Prescriptions    AMOXICILLIN (AMOXIL) 500 MG CAPSULE    Take 1 capsule by mouth 2 times daily for 10 days       Follow-up:  Pina Arceo   [address] [place] [address] ED  Elieser Hernández [address]  [address] [facility] [address]  [phone]            Final Impression:   1.  Non-recurrent acute serous otitis media of left ear               (Please note that portions of this note were completed with a voice recognition program )        Elena Jimenez [address], PA-C  01/03/22 1423

## 2022-01-03 NOTE — ED PROVIDER NOTES
eMERGENCY dEPARTMENT eNCOUnter   [address] Name: Madie Calero  MRN: [id number]  [place] 1980  Date of evaluation: 1/3/22     Madie Calero is a 35 y.o. female with CC: Otalgia      Based on the medical record the care appears appropriate. I was personally available for consultation in the Emergency Department. The care is provided during an unprecedented national emergency due to the novel coronavirus, COVID 19.     Dmitryi Curiel MD  Attending Emergency Physician                    Dmitriy Curiel MD  01/03/22 9724

## 2022-02-07 ENCOUNTER — TELEPHONE (OUTPATIENT)
Dept: ORTHOPEDIC SURGERY | Age: 42
End: 2022-02-07

## 2022-02-07 NOTE — TELEPHONE ENCOUNTER
Charlie [facility] [address] called [phone] and needs a return call back regarding faxed documents for the patient, thank you

## 2022-03-10 NOTE — TELEPHONE ENCOUNTER
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 Called the office and spoke with Rebekah Ha about Octaviano shankar

## 2022-08-01 ENCOUNTER — APPOINTMENT (OUTPATIENT)
Dept: GENERAL RADIOLOGY | Age: 42
End: 2022-08-01
Payer: COMMERCIAL

## 2022-08-01 ENCOUNTER — HOSPITAL ENCOUNTER (EMERGENCY)
Age: 42
Discharge: HOME | End: 2022-08-01
Attending: EMERGENCY MEDICINE
Payer: COMMERCIAL

## 2022-08-01 VITALS
OXYGEN SATURATION: 95 % | SYSTOLIC BLOOD PRESSURE: 107 MMHG | RESPIRATION RATE: 16 BRPM | DIASTOLIC BLOOD PRESSURE: 58 MMHG | TEMPERATURE: 97.9 F | HEART RATE: 101 BPM

## 2022-08-01 DIAGNOSIS — S90.30XA CONTUSION OF DORSUM OF FOOT: Primary | ICD-10-CM

## 2022-08-01 PROCEDURE — 73630 X-RAY EXAM OF FOOT: CPT

## 2022-08-01 PROCEDURE — 99283 EMERGENCY DEPT VISIT LOW MDM: CPT

## 2022-08-01 ASSESSMENT — ENCOUNTER SYMPTOMS
EYE PAIN: 0
ABDOMINAL PAIN: 0
COLOR CHANGE: 0
BACK PAIN: 0
SHORTNESS OF BREATH: 0

## 2022-08-01 NOTE — ED TRIAGE NOTES
Mode of arrival (squad #, walk in, police, etc) : walk in        Chief complaint(s): Left foot injury        Arrival Note (brief scenario, treatment PTA, etc). : Pt states she was at work when she dropped a wall on her left foot. C= \"Have you ever felt that you should Cut down on your drinking? \"  No  A= \"Have people Annoyed you by criticizing your drinking? \"  No  G= \"Have you ever felt bad or Guilty about your drinking? \"  No  E= \"Have you ever had a drink as an Eye-opener first thing in the morning to steady your nerves or to help a hangover? \"  No      Deferred []      Reason for deferring: N/A    *If yes to two or more: probable alcohol abuse. *

## 2022-08-01 NOTE — Clinical Note
Josephine Barakat was seen and treated in our emergency department on 8/1/2022. She may return to work on 08/04/2022. If you have any questions or concerns, please don't hesitate to call.       [address] [facility] [address], DO

## 2022-08-01 NOTE — ED PROVIDER NOTES
EMERGENCY DEPARTMENT ENCOUNTER    Pt Name: Shabana Escobar  MRN: [id number]  [place] 1980  Date of evaluation: 8/1/22  CHIEF COMPLAINT       Chief Complaint   Patient presents with    Foot Injury     HISTORY OF PRESENT ILLNESS   78-year-old female presents for left foot injury. Patient reports that she was at work setting up a movable wall and the wall tipped over and fell onto her foot. Patient denies other injuries, denies any knee or ankle pain, denies any head injuries, denies any numbness tingling or weakness to the extremity. The history is provided by the patient. REVIEW OF SYSTEMS     Review of Systems   Constitutional:  Negative for fever. HENT:  Negative for congestion and ear pain. Eyes:  Negative for pain. Respiratory:  Negative for shortness of breath. Cardiovascular:  Negative for chest pain, palpitations and leg swelling. Gastrointestinal:  Negative for abdominal pain. Genitourinary:  Negative for dysuria and flank pain. Musculoskeletal:  Negative for back pain. Left foot pain   Skin:  Negative for color change. Neurological:  Negative for numbness and headaches. Psychiatric/Behavioral:  Negative for confusion. All other systems reviewed and are negative.   PASTMEDICAL HISTORY     Past Medical History:   Diagnosis Date    Anxiety     Depression     GERD (gastroesophageal reflux disease) 02/2020    ON RX    Hernia, ventral     Ventral hernia 2018    repaired x 3 with mesh, has another hernia as of 2020    Wears dentures     UPPER AND LOWER BUT DOES NOT WEAR    Wears glasses      Past Problem List  Patient Active Problem List   Diagnosis Code    Cholelithiasis and cholecystitis without obstruction K80.10    Recurrent incisional hernia with incarceration K43.0    Morbid obesity ([address] E66.01    Recurrent incisional hernia K43.2    Left carpal tunnel syndrome W31.92    Umbilical hernia without obstruction and without gangrene K42.9     SURGICAL HISTORY       Past Surgical History:   Procedure Laterality Date    CARPAL TUNNEL RELEASE Left 3/17/2021    CARPAL TUNNEL RELEASE performed by Darnell Mora MD at [address], LAPAROSCOPIC N/A 2/10/2020    LAPAROSCOPIC CHOLECYSTECTOMY [facility]ana [facility]yer DO [facility]      at age 7    [address] [address]  1165    UMBILICAL WITH MESH    HERNIA REPAIR  9564    UMBILICAL  WITH MESH    HERNIA REPAIR N/A 2020    XI ROBOTIC LAPAROSCOPIC [address] [place], LYSIS OF ADHESIONS performed by Dayana Beyer DO at [address]  2020    ROBOTIC [address] WITH MESH, LYSIS OF ADHESIONS     TONSILLECTOMY      TOOTH EXTRACTION      all teeth including wisdom    TUBAL LIGATION  9482    UMBILICAL HERNIA REPAIR N/A 7231    HERNIA UMBILICAL REPAIR performed by Dayana Beyer DO at [facility]       Discharge Medication List as of 2022  8:52 PM        CONTINUE these medications which have NOT CHANGED    Details   ibuprofen (ADVIL;MOTRIN) 800 MG tablet Take 1 tablet by mouth every 8 hours as needed for Pain, Disp-120 tablet, R-3Normal      buPROPion (WELLBUTRIN XL) 150 MG extended release tablet Take 150 mg by mouth every morningHistorical Med           ALLERGIES     is allergic to adhesive tape and seasonal.  FAMILY HISTORY     She indicated that her mother is alive. She indicated that her father is alive. She indicated that both of her brothers are alive. She indicated that her maternal grandmother is . She indicated that her maternal grandfather is . She indicated that her paternal grandmother is . She indicated that her paternal grandfather is .      SOCIAL HISTORY       Social History     Tobacco Use    Smoking status: Every Day     Packs/day: 0.50     Years: 25.00     Pack years: 12.50     Types: Cigarettes    Smokeless tobacco: Never    Tobacco comments:     3/30/21: cut down to 1/2 ppd, now, from 1 ppd previously. Vaping Use    Vaping Use: Never used   Substance Use Topics    Alcohol use: Yes     Comment: BEER-1 OR 2 A MONTH    Drug use: Never     PHYSICAL EXAM     INITIAL VITALS: BP (!) 107/58   Pulse (!) 101   Temp 97.9 °F (36.6 °C)   Resp 16   SpO2 95%    Physical Exam  Vitals and nursing note reviewed. Constitutional:       General: She is not in acute distress. Appearance: Normal appearance. She is not ill-appearing or toxic-appearing. HENT:      Head: Normocephalic and atraumatic. Nose: Nose normal.      Mouth/Throat:      Mouth: Mucous membranes are moist.      Pharynx: Oropharynx is clear. Eyes:      Extraocular Movements: Extraocular movements intact. Conjunctiva/sclera: Conjunctivae normal.      Pupils: Pupils are equal, round, and reactive to light. Cardiovascular:      Rate and Rhythm: Normal rate and regular rhythm. Pulses: Normal pulses. Dorsalis pedis pulses are 2+ on the right side and 2+ on the left side. Heart sounds: Normal heart sounds. Pulmonary:      Effort: Pulmonary effort is normal.      Breath sounds: Normal breath sounds. Abdominal:      General: Bowel sounds are normal. There is no distension. Palpations: Abdomen is soft. Tenderness: There is no abdominal tenderness. Musculoskeletal:         General: Normal range of motion. Cervical back: Normal range of motion. No spinous process tenderness or muscular tenderness. Left foot: Normal capillary refill. Tenderness present. Feet:    Skin:     General: Skin is warm and dry. Capillary Refill: Capillary refill takes less than 2 seconds. Neurological:      General: No focal deficit present. Mental Status: She is alert and oriented to person, place, and time. Cranial Nerves: Cranial nerves are intact. Sensory: Sensation is intact. Motor: Motor function is intact. Psychiatric:         Mood and Affect: Mood normal.         Thought Content: Thought content does not include homicidal or suicidal ideation. MEDICAL DECISION MAKIN-year-old female presents for left foot injury. On initial exam patient in no acute distress vitals are stable, tenderness noted over the dorsum of the left foot with some ecchymosis, neurovascular intact, full range of motion at the foot, +2 DP pulse, anterior compartments are soft, no pain with passive extension of the foot, will check x-ray    X-ray was negative for bony injury, suspect likely contusion, will place in a postop shoe for comfort and provide information for podiatry follow-up    Results were discussed with patient, discussed continued supportive care and need for follow-up with PCP as well as podiatry and return precautions, patient voiced understanding comfortable with plan and discharge home    Patient/Guardian was informed of their diagnosis and told to follow up with PCP & podiatry in 1-3 days. Patient demonstrates understanding and agreement with the plan. They were given the opportunity to ask questions and those questions were answered to the best of our ability with the available information. Patient/Guardian told to return to the ED for any new, worsening, changing or persistent symptoms. This dictation was prepared using Smile voice recognition software. CRITICAL CARE:       PROCEDURES:    Procedures    DIAGNOSTIC RESULTS   EKG:All EKG's are interpreted by the Emergency Department Physician who either signs or Co-signs this chart in the absence of a cardiologist.        RADIOLOGY:All plain film, CT, MRI, and formal ultrasound images (except ED bedside ultrasound) are read by the radiologist, see reports below, unless otherwisenoted in MDM or here. XR FOOT LEFT (MIN 3 VIEWS)   Final Result   No acute osseous abnormality.            LABS: All lab results were reviewed by myself, and all abnormals are listed below. Labs Reviewed - No data to display    EMERGENCY DEPARTMENTCOURSE:         Vitals:    Vitals:    08/01/22 1914   BP: (!) 107/58   Pulse: (!) 101   Resp: 16   Temp: 97.9 °F (36.6 °C)   SpO2: 95%       The patient was given the following medications while in the emergency department:  No orders of the defined types were placed in this encounter. CONSULTS:  None    FINAL IMPRESSION      1. Contusion of dorsum of foot          DISPOSITION/PLAN   DISPOSITION Decision To Discharge 08/01/2022 08:25:19 PM      PATIENT REFERRED TO:  Pina Arceo   [address]    Schedule an appointment as soon as possible for a visit       Elen[address]we [address] ED  [facility]  [facility]  [phone]    As needed, If symptoms worsen    [address], [address] [facility]  [address] [place] [address]  [phone]    Schedule an appointment as soon as possible for a visit     DISCHARGE MEDICATIONS:  Discharge Medication List as of 8/1/2022  8:52 PM        The care is provided during an unprecedented national emergency due to the novel coronavirus, COVID 19.   DO Katarzyna Eisenberg DO  08/02/22 1500

## 2024-01-05 ENCOUNTER — APPOINTMENT (OUTPATIENT)
Dept: GENERAL RADIOLOGY | Age: 44
End: 2024-01-05

## 2024-01-05 ENCOUNTER — HOSPITAL ENCOUNTER (EMERGENCY)
Age: 44
Discharge: HOME | End: 2024-01-05
Attending: EMERGENCY MEDICINE

## 2024-01-05 VITALS
BODY MASS INDEX: 40.31 KG/M2 | DIASTOLIC BLOOD PRESSURE: 67 MMHG | SYSTOLIC BLOOD PRESSURE: 109 MMHG | TEMPERATURE: 97 F | OXYGEN SATURATION: 96 % | RESPIRATION RATE: 16 BRPM | HEIGHT: 71 IN | HEART RATE: 97 BPM

## 2024-01-05 DIAGNOSIS — S76.011A STRAIN OF RIGHT HIP, INITIAL ENCOUNTER: Primary | ICD-10-CM

## 2024-01-05 PROCEDURE — 73552 X-RAY EXAM OF FEMUR 2/>: CPT

## 2024-01-05 PROCEDURE — 99283 EMERGENCY DEPT VISIT LOW MDM: CPT

## 2024-01-05 PROCEDURE — 73502 X-RAY EXAM HIP UNI 2-3 VIEWS: CPT

## 2024-01-05 ASSESSMENT — LIFESTYLE VARIABLES
HOW OFTEN DO YOU HAVE A DRINK CONTAINING ALCOHOL: NEVER
HOW MANY STANDARD DRINKS CONTAINING ALCOHOL DO YOU HAVE ON A TYPICAL DAY: PATIENT DOES NOT DRINK

## 2024-01-05 ASSESSMENT — PAIN - FUNCTIONAL ASSESSMENT: PAIN_FUNCTIONAL_ASSESSMENT: 0-10

## 2024-01-05 ASSESSMENT — PAIN SCALES - GENERAL: PAINLEVEL_OUTOF10: 10

## 2024-01-05 NOTE — ED PROVIDER NOTES
EMERGENCY DEPARTMENT ENCOUNTER    Pt Name: Keeley Duggan  MRN: [id number]  Birthdate 1980  Date of evaluation: 1/5/24  CHIEF COMPLAINT       Chief Complaint   Patient presents with    Fall    Hip Pain     HISTORY OF PRESENT ILLNESS   Presents to the ED for evaluation of right hip pain.  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  States she is now having pain in the right hip that radiates down the femur.  States pain is minimal with sitting but severe with walking.  She denies pain in the tailbone, lumbar spine, knee.  No head injury or LOC.  Denies numbness. She did take motrin.  Denies pregnancy. No other complaints.     The history is provided by the patient.           PASTMEDICAL HISTORY     Past Medical History:   Diagnosis Date    Anxiety     Depression     GERD (gastroesophageal reflux disease) 02/2020    ON RX    Hernia, ventral     Ventral hernia 2018    repaired x 3 with mesh, has another hernia as of 2020    Wears dentures     UPPER AND LOWER BUT DOES NOT WEAR    Wears glasses      Past Problem List  Patient Active Problem List   Diagnosis Code    Cholelithiasis and cholecystitis without obstruction K80.10    Recurrent incisional hernia with incarceration K43.0    Morbid obesity (HCC) E66.01    Recurrent incisional hernia K43.2    Left carpal tunnel syndrome G56.02    Umbilical hernia without obstruction and without gangrene K42.9     SURGICAL HISTORY       Past Surgical History:   Procedure Laterality Date    CARPAL TUNNEL RELEASE Left 3/17/2021    CARPAL TUNNEL RELEASE performed by [facility]utosh Merritt MD at [facility] OR    CHOLECYSTECTOMY, LAPAROSCOPIC N/A 2/10/2020    LAPAROSCOPIC CHOLECYSTECTOMY performed by Charli Flowers DO at [facility] OR    COLONOSCOPY      at age 5    HERNIA REPAIR  2011    UMBILICAL WITH MESH    HERNIA REPAIR  2013    UMBILICAL WITH MESH    HERNIA REPAIR  2015    UMBILICAL  WITH MESH    HERNIA REPAIR N/A 5/20/2020    XI ROBOTIC LAPAROSCOPIC INCISIONAL HERNIA REPAIR

## 2024-01-05 NOTE — DISCHARGE INSTRUCTIONS
Recommend close follow up with orthopedics or PCP. Alternate motrin and tylenol, rest, ice.  Return to the ED if you develop worsening pain, numbness, swelling or any other concerning symptoms.    Additional Safety/Bands:

## 2024-01-05 NOTE — ED PROVIDER NOTES
eMERGENCY dEPARTMENT eNCOUnter   Independent Attestation     Pt Name: Keeley Duggan  MRN: [id number]  Birthdate 1980  Date of evaluation: 1/5/24     Keeley Duggan is a 43 y.o. female with CC: Fall and Hip Pain      Based on the medical record the care appears appropriate.  I was personally available for consultation in the Emergency Department.    Duong Dasilva DO  Attending Emergency Physician                  Duong Dasilva DO  01/05/24 1917

## 2024-12-27 ENCOUNTER — TELEPHONE (OUTPATIENT)
Age: 44
End: 2024-12-27

## 2024-12-27 ENCOUNTER — TRANSCRIBE ORDERS (OUTPATIENT)
Dept: ADMINISTRATIVE | Age: 44
End: 2024-12-27

## 2024-12-27 DIAGNOSIS — Z12.31 ENCOUNTER FOR SCREENING MAMMOGRAM FOR BREAST CANCER: Primary | ICD-10-CM

## 2024-12-27 NOTE — TELEPHONE ENCOUNTER
Received a referral for patient for a mole on the right lateral breast that is increasing in size. LVM for patient to see if she would like to schedule appt. Referral has been scanned into media. Can be scheduled with either provider.

## 2024-12-30 ENCOUNTER — PREP FOR PROCEDURE (OUTPATIENT)
Dept: GASTROENTEROLOGY | Age: 44
End: 2024-12-30

## 2024-12-30 DIAGNOSIS — Z12.11 COLON CANCER SCREENING: ICD-10-CM

## 2024-12-30 RX ORDER — SODIUM, POTASSIUM,MAG SULFATES 17.5-3.13G
SOLUTION, RECONSTITUTED, ORAL ORAL
Qty: 1 EACH | Refills: 0 | Status: SHIPPED | OUTPATIENT
Start: 2024-12-30

## 2024-12-30 NOTE — TELEPHONE ENCOUNTER
Procedure scheduled/Dr Abbasi  Procedure:colon  Dx: screening  Date:04/08/2025  Time:1030 am   Hospital:[facility] phone call:tbb  Bowel Prep instructions given:suprep  In office/via phone: phone  Clearance needed:none

## 2025-01-03 ENCOUNTER — OFFICE VISIT (OUTPATIENT)
Age: 45
End: 2025-01-03

## 2025-01-03 VITALS
SYSTOLIC BLOOD PRESSURE: 125 MMHG | TEMPERATURE: 98.2 F | DIASTOLIC BLOOD PRESSURE: 64 MMHG | BODY MASS INDEX: 37.66 KG/M2 | OXYGEN SATURATION: 96 % | HEART RATE: 90 BPM | HEIGHT: 71 IN | WEIGHT: 269 LBS

## 2025-01-03 DIAGNOSIS — L82.0 INFLAMED SEBORRHEIC KERATOSIS: Primary | ICD-10-CM

## 2025-01-03 DIAGNOSIS — L71.9 ROSACEA: ICD-10-CM

## 2025-01-03 RX ORDER — DOXYCYCLINE 100 MG/1
CAPSULE ORAL
Qty: 60 CAPSULE | Refills: 0 | Status: CANCELLED | OUTPATIENT
Start: 2025-01-03

## 2025-01-03 RX ORDER — IBUPROFEN 800 MG/1
1 TABLET, FILM COATED ORAL EVERY 8 HOURS PRN
COMMUNITY

## 2025-01-03 RX ORDER — DOXYCYCLINE 100 MG/1
CAPSULE ORAL
Qty: 30 CAPSULE | Refills: 0 | Status: SHIPPED | OUTPATIENT
Start: 2025-01-03

## 2025-01-03 RX ORDER — HYDROCODONE BITARTRATE AND ACETAMINOPHEN 5; 325 MG/1; MG/1
TABLET ORAL
COMMUNITY

## 2025-01-03 NOTE — PROGRESS NOTES
Dermatology Patient Note  [facility] PHYSICIANS [facility]  [facility] DERMATOLOGY  [address] [facility]  [place] [address]  Dept: [phone]  Dept Fax: [phone]      VISITDATE: 1/3/2025   REFERRING PROVIDER: No ref. provider found      Keeley Duggan is a 44 y.o. female  who presents today in the office for:    New Patient (New pt presents to the office for a mole on the right breast. States that it has been present for around 6 months to a year. States that it has increased in size, states that the bra does rub on the area and causes tenderness. Also, has concerns with her rosacea on the face that is flaring up more frequent than normal. States that she has not been treated in the past for it prior to today. NO personal or fm hx of skin cx )      HISTORY OF PRESENT ILLNESS:  As above.    MEDICAL PROBLEMS:  Patient Active Problem List    Diagnosis Date Noted    Colon cancer screening 12/30/2024    Umbilical hernia without obstruction and without gangrene 05/05/2021    Left carpal tunnel syndrome 03/17/2021    Recurrent incisional hernia with incarceration 05/20/2020    Morbid obesity 05/20/2020    Recurrent incisional hernia 05/20/2020    Cholelithiasis and cholecystitis without obstruction 02/10/2020       CURRENT MEDICATIONS:   Current Outpatient Medications   Medication Sig Dispense Refill    ibuprofen (ADVIL;MOTRIN) 800 MG tablet Take 1 tablet by mouth every 8 hours as needed      HYDROcodone-acetaminophen (NORCO) 5-325 MG per tablet       metroNIDAZOLE (METROCREAM) 0.75 % cream Apply topically 2 times daily. 60 g 2    doxycycline hyclate (VIBRAMYCIN) 100 MG capsule Take one tablet PO daily 30 capsule 0    sodium-potassium-mag sulfate (SUPREP BOWEL PREP KIT) 17.5-3.13-1.6 GM/177ML SOLN solution Please follow instructions as given to you by your provider (Patient not taking: Reported on 1/3/2025) 1 each 0    buPROPion (WELLBUTRIN XL) 150 MG extended release tablet Take 150 mg by mouth every

## 2025-01-29 PROBLEM — Z12.11 COLON CANCER SCREENING: Status: RESOLVED | Noted: 2024-12-30 | Resolved: 2025-01-29

## 2025-02-07 ENCOUNTER — TELEPHONE (OUTPATIENT)
Dept: SURGERY | Age: 45
End: 2025-02-07

## 2025-02-07 NOTE — TELEPHONE ENCOUNTER
Writer called patient LVM to call office to schedule consult with [facility].  Referral and records scanned in [facility]. Referral fax to office from Dr Juan Sears with [facility] office, confirm with patient consult is for colonoscopy screening.

## 2025-02-11 NOTE — TELEPHONE ENCOUNTER
2ND ATTEMPT:  Writer called patient LVM to call office to schedule consult with [facility].

## 2025-03-25 ENCOUNTER — PATIENT MESSAGE (OUTPATIENT)
Dept: GASTROENTEROLOGY | Age: 45
End: 2025-03-25

## 2025-03-25 ENCOUNTER — HOSPITAL ENCOUNTER (OUTPATIENT)
Dept: PREADMISSION TESTING | Age: 45
Discharge: HOME | End: 2025-03-29

## 2025-03-26 VITALS — BODY MASS INDEX: 37.66 KG/M2 | WEIGHT: 269 LBS | HEIGHT: 71 IN

## 2025-03-26 RX ORDER — MULTIVITAMIN WITH IRON
1 TABLET ORAL DAILY
COMMUNITY

## 2025-03-26 NOTE — PROGRESS NOTES
Pre-op Instructions For Out-Patient Endoscopy Surgery    Medication Instructions:  Please stop herbs and any supplements now (includes vitamins and minerals). MVI    For these medications:  Dulaglutide (Trulicity), Exenatide (Byetta and Bydureon, Liraglutide (Victoza), Lixisenatide (Adlyxin), Semaglutide (Ozempic and Rybelsus), Tirzepatide (Mounjaro, Zepbound)- Stop 1 week prior if taking weekly or 1 day prior if taking every 12 hours or daily. N/A    Please contact your surgeon and prescribing physician for pre-op instructions for any blood thinners. Ibuprofen     If you have inhalers/aerosol treatments at home, please use them the morning of your surgery and bring the inhalers with you to the hospital. N/A    Please take the following medications the morning of your surgery with a sip of water: None    Surgery Instructions:  After midnight before surgery:  Do not eat or drink anything, including water, mints, gum, and hard candy.  You may brush your teeth without swallowing.  No smoking, chewing tobacco, or street drugs.   ** Please Follow Bowel Prep instructions if given by surgeon's office**    Please shower or bathe before surgery.       Please do not wear any cologne, lotion, powder, jewelry, piercings, perfume, makeup, nail polish, hair accessories, or hair spray on the day of surgery.  Wear loose comfortable clothing.    Leave your valuables at home but bring a payment source for any after-surgery prescriptions you plan to fill at [facility].  Bring a storage case for any glasses/contacts.    An adult who is responsible for you MUST drive you home and should be with you for the first 24 hours after surgery. Mother     The Day of Surgery:  Arrive at [facility] Surgery Entrance at the time directed by your surgeon and check in at the desk.     If you have a living will or healthcare power of , please bring a copy.    You will be taken to the pre-op holding area where you will

## 2025-03-27 PROBLEM — Z12.11 COLON CANCER SCREENING: Status: ACTIVE | Noted: 2024-12-30

## 2025-04-04 NOTE — PRE-PROCEDURE INSTRUCTIONS
Have you received your Prep? Any questions with prep instructions?   Only Clear Liquid Diet day before.  Nothing to eat after midnight day before procedure.  Are you taking any blood thinners? If so, you need to Stop.  Remove any jewelry and body piercings.  Do you wear glasses? If so, please bring a case to store them in.  Are you having any Covid symptoms?  Do you have any new rashes, infections, etc. that we should be aware of?  Do you have a ride home the day of surgery? It cannot be a cab or medical transportation.  Verify surgery time/date and what time to arrive at hospital.  NO ANSWER, VM LEFT TO ARRIVE AT 0815

## 2025-04-07 ENCOUNTER — ANESTHESIA EVENT (OUTPATIENT)
Dept: ENDOSCOPY | Age: 45
End: 2025-04-07
Payer: COMMERCIAL

## 2025-04-08 ENCOUNTER — HOSPITAL ENCOUNTER (OUTPATIENT)
Age: 45
Setting detail: OUTPATIENT SURGERY
Discharge: HOME | End: 2025-04-08
Attending: STUDENT IN AN ORGANIZED HEALTH CARE EDUCATION/TRAINING PROGRAM | Admitting: STUDENT IN AN ORGANIZED HEALTH CARE EDUCATION/TRAINING PROGRAM
Payer: COMMERCIAL

## 2025-04-08 ENCOUNTER — ANESTHESIA (OUTPATIENT)
Dept: ENDOSCOPY | Age: 45
End: 2025-04-08
Payer: COMMERCIAL

## 2025-04-08 VITALS
SYSTOLIC BLOOD PRESSURE: 113 MMHG | OXYGEN SATURATION: 100 % | RESPIRATION RATE: 17 BRPM | HEART RATE: 86 BPM | WEIGHT: 269 LBS | HEIGHT: 71 IN | BODY MASS INDEX: 37.66 KG/M2 | DIASTOLIC BLOOD PRESSURE: 82 MMHG | TEMPERATURE: 98.1 F

## 2025-04-08 DIAGNOSIS — Z12.11 COLON CANCER SCREENING: ICD-10-CM

## 2025-04-08 PROCEDURE — 3609010600 HC COLONOSCOPY POLYPECTOMY SNARE/COLD BIOPSY: Performed by: STUDENT IN AN ORGANIZED HEALTH CARE EDUCATION/TRAINING PROGRAM

## 2025-04-08 PROCEDURE — 2580000003 HC RX 258: Performed by: ANESTHESIOLOGY

## 2025-04-08 PROCEDURE — 3700000001 HC ADD 15 MINUTES (ANESTHESIA): Performed by: STUDENT IN AN ORGANIZED HEALTH CARE EDUCATION/TRAINING PROGRAM

## 2025-04-08 PROCEDURE — 6360000002 HC RX W HCPCS: Performed by: NURSE ANESTHETIST, CERTIFIED REGISTERED

## 2025-04-08 PROCEDURE — 2709999900 HC NON-CHARGEABLE SUPPLY: Performed by: STUDENT IN AN ORGANIZED HEALTH CARE EDUCATION/TRAINING PROGRAM

## 2025-04-08 PROCEDURE — 7100000010 HC PHASE II RECOVERY - FIRST 15 MIN: Performed by: STUDENT IN AN ORGANIZED HEALTH CARE EDUCATION/TRAINING PROGRAM

## 2025-04-08 PROCEDURE — 7100000011 HC PHASE II RECOVERY - ADDTL 15 MIN: Performed by: STUDENT IN AN ORGANIZED HEALTH CARE EDUCATION/TRAINING PROGRAM

## 2025-04-08 PROCEDURE — 6360000002 HC RX W HCPCS: Performed by: ANESTHESIOLOGY

## 2025-04-08 PROCEDURE — 88305 TISSUE EXAM BY PATHOLOGIST: CPT

## 2025-04-08 PROCEDURE — 3700000000 HC ANESTHESIA ATTENDED CARE: Performed by: STUDENT IN AN ORGANIZED HEALTH CARE EDUCATION/TRAINING PROGRAM

## 2025-04-08 RX ORDER — LIDOCAINE HYDROCHLORIDE 20 MG/ML
INJECTION, SOLUTION INFILTRATION; PERINEURAL
Status: DISCONTINUED | OUTPATIENT
Start: 2025-04-08 | End: 2025-04-08 | Stop reason: SDUPTHER

## 2025-04-08 RX ORDER — SODIUM CHLORIDE, SODIUM LACTATE, POTASSIUM CHLORIDE, CALCIUM CHLORIDE 600; 310; 30; 20 MG/100ML; MG/100ML; MG/100ML; MG/100ML
INJECTION, SOLUTION INTRAVENOUS CONTINUOUS
Status: DISCONTINUED | OUTPATIENT
Start: 2025-04-08 | End: 2025-04-08 | Stop reason: HOSPADM

## 2025-04-08 RX ORDER — PROPOFOL 10 MG/ML
INJECTION, EMULSION INTRAVENOUS
Status: DISCONTINUED | OUTPATIENT
Start: 2025-04-08 | End: 2025-04-08 | Stop reason: SDUPTHER

## 2025-04-08 RX ORDER — SODIUM CHLORIDE 0.9 % (FLUSH) 0.9 %
5-40 SYRINGE (ML) INJECTION EVERY 12 HOURS SCHEDULED
Status: DISCONTINUED | OUTPATIENT
Start: 2025-04-08 | End: 2025-04-08 | Stop reason: HOSPADM

## 2025-04-08 RX ORDER — SODIUM CHLORIDE 9 MG/ML
INJECTION, SOLUTION INTRAVENOUS PRN
Status: DISCONTINUED | OUTPATIENT
Start: 2025-04-08 | End: 2025-04-08 | Stop reason: HOSPADM

## 2025-04-08 RX ORDER — LIDOCAINE HYDROCHLORIDE 10 MG/ML
1 INJECTION, SOLUTION EPIDURAL; INFILTRATION; INTRACAUDAL; PERINEURAL
Status: COMPLETED | OUTPATIENT
Start: 2025-04-08 | End: 2025-04-08

## 2025-04-08 RX ORDER — SODIUM CHLORIDE 0.9 % (FLUSH) 0.9 %
5-40 SYRINGE (ML) INJECTION PRN
Status: DISCONTINUED | OUTPATIENT
Start: 2025-04-08 | End: 2025-04-08 | Stop reason: HOSPADM

## 2025-04-08 RX ADMIN — PROPOFOL 50 MG: 10 INJECTION, EMULSION INTRAVENOUS at 11:04

## 2025-04-08 RX ADMIN — PROPOFOL 100 MG: 10 INJECTION, EMULSION INTRAVENOUS at 10:52

## 2025-04-08 RX ADMIN — SODIUM CHLORIDE, POTASSIUM CHLORIDE, SODIUM LACTATE AND CALCIUM CHLORIDE: 600; 310; 30; 20 INJECTION, SOLUTION INTRAVENOUS at 08:52

## 2025-04-08 RX ADMIN — PROPOFOL 50 MG: 10 INJECTION, EMULSION INTRAVENOUS at 11:08

## 2025-04-08 RX ADMIN — PROPOFOL 50 MG: 10 INJECTION, EMULSION INTRAVENOUS at 11:12

## 2025-04-08 RX ADMIN — LIDOCAINE HYDROCHLORIDE 50 MG: 20 INJECTION, SOLUTION INFILTRATION; PERINEURAL at 10:52

## 2025-04-08 RX ADMIN — LIDOCAINE HYDROCHLORIDE 1 ML: 10 INJECTION, SOLUTION EPIDURAL; INFILTRATION; INTRACAUDAL; PERINEURAL at 08:52

## 2025-04-08 RX ADMIN — PROPOFOL 50 MG: 10 INJECTION, EMULSION INTRAVENOUS at 10:58

## 2025-04-08 ASSESSMENT — ENCOUNTER SYMPTOMS
NAUSEA: 0
SHORTNESS OF BREATH: 0
ABDOMINAL PAIN: 0
SINUS PRESSURE: 0

## 2025-04-08 ASSESSMENT — PAIN - FUNCTIONAL ASSESSMENT
PAIN_FUNCTIONAL_ASSESSMENT: NONE - DENIES PAIN
PAIN_FUNCTIONAL_ASSESSMENT: 0-10

## 2025-04-08 NOTE — ANESTHESIA PRE PROCEDURE
Department of Anesthesiology  Preprocedure Note       Name:  Keeley Duggan   Age:  44 y.o.  :  1980                                          MRN:  [id number]         Date:  2025      Surgeon: Surgeon(s):  Malcom Abbasi MD    Procedure: Procedure(s):  COLORECTAL CANCER SCREENING, NOT HIGH RISK    Medications prior to admission:   Prior to Admission medications    Medication Sig Start Date End Date Taking? Authorizing Provider   Multiple Vitamin (MULTIVITAMIN) TABS tablet Take 1 tablet by mouth daily    ProviderMatheus MD   ibuprofen (ADVIL;MOTRIN) 800 MG tablet Take 1 tablet by mouth every 8 hours as needed    ProviderMatheus MD   sodium-potassium-mag sulfate (SUPREP BOWEL PREP KIT) 17.5-3.13-1.6 GM/177ML SOLN solution Please follow instructions as given to you by your provider  Patient not taking: Reported on 1/3/2025 12/30/24   Malcom Abbasi MD       Current medications:    Current Facility-Administered Medications   Medication Dose Route Frequency Provider Last Rate Last Admin   • lidocaine PF 1 % injection 1 mL  1 mL IntraDERmal Once PRN Richard Montana MD       • lactated ringers infusion   IntraVENous Continuous Richard Montana MD       • sodium chloride flush 0.9 % injection 5-40 mL  5-40 mL IntraVENous 2 times per day Richard Montana MD       • sodium chloride flush 0.9 % injection 5-40 mL  5-40 mL IntraVENous PRN Richard Montana MD       • 0.9 % sodium chloride infusion   IntraVENous PRN Richard Montana MD           Allergies:    Allergies   Allergen Reactions   • Adhesive Tape Rash   • Seasonal        Problem List:    Patient Active Problem List   Diagnosis Code   • Cholelithiasis and cholecystitis without obstruction K80.10   • Recurrent incisional hernia with incarceration K43.0   • Morbid obesity E66.01   • Recurrent incisional hernia K43.2   • Left carpal tunnel syndrome G56.02   • Umbilical hernia without obstruction and without gangrene K42.9   • Colon

## 2025-04-08 NOTE — H&P
HISTORY and PHYSICAL  [facility]       NAME:  Keeley Duggan  MRN: [id number]   YOB: 1980   Date: 4/8/2025   Age: 44 y.o.  Gender: female       COMPLAINT AND PRESENT HISTORY:         Keeley Duggan is 44 y.o.,   female, having a Screening Colonoscopy.  Patient is being seen for Pre-Op Diagnosis Codes:      * Colon cancer screening     Prior Colonoscopy was done last in at age 5.  No colonoscopy as adults.   Patient has hx of Colon Polyps.     Patient is s/p GB, hernia  Complaints of abdominal pain : no   Changes in bowel habits : no .    issues with  diarrhea, constipation: no  blood in stools : no        BRBPR or  black tarry stools:  no      hx of hemorrhoids : no   Changes in appetite :  yes .      Nausea:   no   , vomiting: no.    Heartburn, indigestion or acid reflux : yes, sometimes       Pt is taking OTC Tums  that is managing sxs.   Family Hx of colon cancer:  no    Medical history reviewed.   Patient denies any chest pain/pressure/palpitations.  Pt reports no SOB. Pt denies any recent URI. No fever or chills.     Nurse has reviewed  medications.    blood thinners :  ibuprofen, vitamins    Patient states  has completed and followed prescribed prep with clear outcome.    Any personal or family problems with anesthesia : no    RECENT LABS, IMAGING AND TESTING     Lab Results   Component Value Date    WBC 9.3 05/05/2021    RBC 4.56 05/05/2021    HGB 13.9 05/05/2021    HCT 42.4 05/05/2021    MCV 93.0 05/05/2021    MCH 30.5 05/05/2021    MCHC 32.8 05/05/2021    RDW 13.0 05/05/2021     05/05/2021    MPV 10.2 05/05/2021        Lab Results   Component Value Date     04/02/2021    K 4.0 04/02/2021     04/02/2021    CO2 25 04/02/2021    BUN 7 04/02/2021    CREATININE 0.57 04/02/2021    GLUCOSE 114 (H) 04/02/2021    CALCIUM 9.1 04/02/2021    BILITOT 0.93 04/02/2021    ALKPHOS 67 04/02/2021    AST 17 04/02/2021    ALT 19 04/02/2021     PAST MEDICAL HISTORY     Past

## 2025-04-08 NOTE — OP NOTE
complication form the procedure noted, patient will be ready for d/c when criteria is met .        The prep was fair after extensive lavage     Findings:    3 diminutive polyps in the transverse colon- cold forceps polypectomy    One diminutive polyp in the rectum- cold forceps polypectomy    A 6 mm polyp was found in the rectum.  The polyp was resected using a cold snare.   There was no bleeding at the end of the procedure.  Resection and retrieval was complete.    No colitis, masses, AVMs in the entire colon     Otherwise normal Cecum, Ascending, Transverse, Descending, and Sigmoid colon     Rectum/Anus: examined in normal and retroflexed positions and showed medium internal hemorrhoids    Withdrawal Time was (minutes): 7    The colon was decompressed and the scope was removed.  The patient tolerated the procedure well.     Recommendations/Plan:   Lifestyle and dietary modifications as discussed  F/U Biopsies  Discharge per PACU protocol. Please return to normal activities from tomorrow.  In case of red flags including severe pain, fever, or bleeding seek immediate care.  Follow-up with PCP as scheduled.  Discussed with the family  Repeat colonoscopy in 3 years with extended prep    Electronically signed by Malcom Abbasi MD  on 4/8/2025 at 11:20 AM   This note is created with the assistance of a speech recognition program.  While intending to generate a document that actually reflects the content of the procedure, the document can still have some errors including those of syntax and sound a like substitutions which may escape proofreading.  It such instances, actual meaning can be extrapolated by contextual diversion.

## 2025-04-08 NOTE — ANESTHESIA POSTPROCEDURE EVALUATION
Department of Anesthesiology  Postprocedure Note    Patient: Keeley Duggan  MRN: [id number]  YOB: 1980  Date of evaluation: 4/8/2025    Procedure Summary       Date: 04/08/25 Room / Location: [place] [address] / [facility]    Anesthesia Start: 1050 Anesthesia Stop: 1125    Procedure: COLONOSCOPY POLYPECTOMY SNARE/BIOPSY (Rectum) Diagnosis:       Colon cancer screening      (Colon cancer screening [Z12.11])    Surgeons: Malcom Abbasi MD Responsible Provider: Silvano Padgett MD    Anesthesia Type: General ASA Status: 2            Anesthesia Type: General    John Phase I: John Score: 10    John Phase II: John Score: 10    Anesthesia Post Evaluation    Patient location during evaluation: PACU  Patient participation: complete - patient participated  Level of consciousness: awake and alert  Airway patency: patent  Nausea & Vomiting: no vomiting  Cardiovascular status: hemodynamically stable  Respiratory status: acceptable  Hydration status: euvolemic  Comments: POST- ANESTHESIA EVALUATION       Pt Name: Keeley Duggan  MRN: [id number]  YOB: 1980  Date of evaluation: 4/8/2025  Time:  1:06 PM      /82   Pulse 86   Temp 98.1 °F (36.7 °C)   Resp 17   Ht 1.803 m (5' 10.98\")   Wt 122 kg (269 lb)   LMP 04/23/2021   SpO2 100%   BMI 37.53 kg/m²      Consciousness Level  Awake  Cardiopulmonary Status  Stable  Pain Adequately Treated YES  Nausea / Vomiting  NO  Adequate Hydration  YES  Anesthesia Related Complications NONE      Electronically signed by Silvano Padgett MD on 4/8/2025 at 1:06 PM         Pain management: satisfactory to patient    No notable events documented.

## 2025-04-08 NOTE — DISCHARGE INSTRUCTIONS
the colon   Diagnostic colonoscopy or sigmoidoscopyexamination of the inside of the intestine with an endoscope   Your colon must be completely cleaned before the procedure. Any stool left in the intestine will block the view. This preparation may start several days before the procedure. Follow your doctor's instructions, which may include any of the following cleansing methods:   Enemas fluid introduced into the rectum to stimulate a bowel movement   Laxativesmedicines that cause you to have soft bowel movements   A clear-liquid diet   Oral cathartic medicinesa large container of fluid to drink, which stimulates a bowel movement   Leading up to your procedure:   Talk to your doctor about your medicines. You may be asked to stop taking some medicines up to one week before the procedure, like:   Anti-inflammatory drugs (eg, aspirin)   Blood thinners, like clopidogrel (Plavix) or warfarin (Coumadin)   Iron supplements or vitamins containing iron.   The night before, eat a light meal. Do not eat or drink anything after midnight.   Wear comfortable clothing.   If you have diabetes, ask your doctor if you need to adjust your insulin dose.   Arrange for a ride home after the procedure.   Anesthesia    You will receive a sedative. This will help you relax. You will be drowsy but awake.   Description of the Procedure    You will be asked to lie on your side or on your back. A scope, a long flexible tube with a camera on the end, will be inserted through the anus. It will be slowly pushed through the rectum to the colon. The scope will also add air to open the colon.   Using the scope, the doctor will locate the polyp. The polyp will be snipped off with a wire snare from the scope. In some cases, the polyp may be destroyed with an electric current. The electric current is also used to close the wound and stop bleeding. The polyps will then be removed for lab testing. When the doctor is finished, the scope will be slowly

## 2025-04-09 LAB — SURGICAL PATHOLOGY REPORT: NORMAL

## (undated) DEVICE — TROCAR: Brand: KII® SLEEVE

## (undated) DEVICE — Device

## (undated) DEVICE — GLOVE ORANGE PI 7   MSG9070

## (undated) DEVICE — GLOVE ORTHO 8   MSG9480

## (undated) DEVICE — SUTURE BARB NON ABSORBABLE V LOC PBT BLU SZ 0 LEN 9 IN GS 21 VLOCN0346

## (undated) DEVICE — GARMENT,MEDLINE,DVT,INT,CALF,MED, GEN2: Brand: MEDLINE

## (undated) DEVICE — TOWEL,OR,DSP,ST,NATURAL,DLX,4/PK,20PK/CS: Brand: MEDLINE

## (undated) DEVICE — GLOVE SURG 7.5 11.7IN BEAD CUF LIGHT BRN SENSICARE LTX FREE

## (undated) DEVICE — BANDAGE,GAUZE,BULKEE II,4.5"X4.1YD,STRL: Brand: MEDLINE

## (undated) DEVICE — GLOVE ORANGE PI 7 1/2   MSG9075

## (undated) DEVICE — SUTURE VCRL SZ 2-0 L36IN ABSRB UD L36MM CT-1 1/2 CIR J945H

## (undated) DEVICE — SCISSOR SURG METZ CRV TIP

## (undated) DEVICE — SNARE ENDOSCP AD L240CM LOOP W10MM SHTH DIA2.4MM RND INSUL

## (undated) DEVICE — DEFENDO AIR WATER SUCTION AND BIOPSY VALVE KIT FOR  OLYMPUS: Brand: DEFENDO AIR/WATER/SUCTION AND BIOPSY VALVE

## (undated) DEVICE — SUTURE MCRYL SZ 4-0 L18IN ABSRB UD L16MM PC-3 3/8 CIR PRIM Y845G

## (undated) DEVICE — TOTAL TRAY, 16FR 10ML SIL FOLEY, URN: Brand: MEDLINE

## (undated) DEVICE — 3M™ STERI-STRIP™ COMPOUND BENZOIN TINCTURE 40 BAGS/CARTON 4 CARTONS/CASE C1544: Brand: 3M™ STERI-STRIP™

## (undated) DEVICE — CANNULA SEAL

## (undated) DEVICE — STRIP,CLOSURE,WOUND,MEDI-STRIP,1/2X4: Brand: MEDLINE

## (undated) DEVICE — SUTURE V-LOC SZ 0 L18IN NONABSORBABLE BLU L37MM GS-21 1/2 VLOCN0326

## (undated) DEVICE — SUTURE SZ 0 27IN 5/8 CIR UR-6  TAPER PT VIOLET ABSRB VICRYL J603H

## (undated) DEVICE — BAG SPEC REM 224ML W4XL6IN DIA10MM 1 HND GYN DISP ENDOPCH

## (undated) DEVICE — SUTURE PROL SZ 1 L30IN NONABSORBABLE BLU L36MM CT-1 1/2 CIR 8425H

## (undated) DEVICE — ADHESIVE SKIN CLSR 0.7ML TOP DERMBND ADV

## (undated) DEVICE — APPLIER CLP M L L11.4IN DIA10MM ENDOSCP ROT MULT FOR LIG

## (undated) DEVICE — HYPODERMIC SAFETY NEEDLE: Brand: MAGELLAN

## (undated) DEVICE — MERCY HEALTH ST CHARLES: Brand: MEDLINE INDUSTRIES, INC.

## (undated) DEVICE — TROCAR: Brand: KII FIOS FIRST ENTRY

## (undated) DEVICE — SOLUTION IV IRRIG WATER 1000ML POUR BRL 2F7114

## (undated) DEVICE — APPLICATOR MEDICATED 26 CC SOLUTION HI LT ORNG CHLORAPREP

## (undated) DEVICE — PLUMEPORT LAPAROSCOPIC SMOKE FILTRATION DEVICE: Brand: PLUMEPORT ACTIV

## (undated) DEVICE — GLOVE ORANGE PI 8 1/2   MSG9085

## (undated) DEVICE — TUBING, SUCTION, 1/4" X 12', STRAIGHT: Brand: MEDLINE

## (undated) DEVICE — POLYP TRAP: Brand: TRAPEASE®

## (undated) DEVICE — DEVICE TRCR 12X9X3IN WHT CLSR DISP OMNICLOSE

## (undated) DEVICE — INSUFFLATION NEEDLE TO ESTABLISH PNEUMOPERITONEUM.: Brand: INSUFFLATION NEEDLE

## (undated) DEVICE — SUTURE PROL SZ 2-0 L30IN NONABSORBABLE BLU L26MM CT-2 1/2 8411H

## (undated) DEVICE — FORCEPS BX L240CM WRK CHN 2.8MM STD CAP W/ NDL MIC MESH

## (undated) DEVICE — ARM DRAPE

## (undated) DEVICE — CHLORAPREP 26ML ORANGE

## (undated) DEVICE — DISSECTOR LAP DIA5MM BLNT TIP ENDOPATH

## (undated) DEVICE — INSUFFLATION TUBING SET WITH FILTER, FUNNEL CONNECTOR AND LUER LOCK: Brand: JOSNOE MEDICAL INC

## (undated) DEVICE — INTENDED FOR TISSUE SEPARATION, AND OTHER PROCEDURES THAT REQUIRE A SHARP SURGICAL BLADE TO PUNCTURE OR CUT.: Brand: BARD-PARKER ® CARBON RIB-BACK BLADES

## (undated) DEVICE — SUTURE ETHLN SZ 3-0 L18IN NONABSORBABLE BLK FS-1 L24MM 3/8 663H

## (undated) DEVICE — ELECTRODE LAPAROSCOPIC LHOOK

## (undated) DEVICE — SUTURE MCRYL SZ 4-0 L27IN ABSRB UD L19MM PS-2 1/2 CIR PRIM Y426H

## (undated) DEVICE — 4-PORT MANIFOLD: Brand: NEPTUNE 2

## (undated) DEVICE — YANKAUER,FLEXIBLE HANDLE,REGLR CAPACITY: Brand: MEDLINE INDUSTRIES, INC.

## (undated) DEVICE — SUTURE MCRYL SZ 4-0 L18IN ABSRB UD L19MM PS-2 3/8 CIR PRIM Y496G

## (undated) DEVICE — ENDO KIT W/SYRINGE: Brand: MEDLINE INDUSTRIES, INC.

## (undated) DEVICE — DRAPE,LAP,CHOLE,W/TROUGHS,STERILE: Brand: MEDLINE

## (undated) DEVICE — GLOVE SURG SZ 65 THK91MIL LTX FREE SYN POLYISOPRENE

## (undated) DEVICE — PROTECTOR ULN NRV PUR FOAM HK LOOP STRP ANATOMICALLY

## (undated) DEVICE — BANDAGE COMPR W3INXL5YD WHT BGE POLY COT M E WRP WV HK AND

## (undated) DEVICE — BLANKET WRM W29.9XL79.1IN UP BODY FORC AIR MISTRAL-AIR

## (undated) DEVICE — TIP COVER ACCESSORY

## (undated) DEVICE — GLOVE ORANGE PI 8   MSG9080

## (undated) DEVICE — MINOR BSIN PK

## (undated) DEVICE — PACK LAP BASIC

## (undated) DEVICE — SUTURE VCRL + SZ 0 L27IN ABSRB VLT L26MM UR-6 5/8 CIR VCP603H

## (undated) DEVICE — GLOVE SURG SZ 8 L12IN FNGR THK79MIL GRN LTX FREE

## (undated) DEVICE — COVER,LIGHT HANDLE,FLX,2/PK: Brand: MEDLINE INDUSTRIES, INC.

## (undated) DEVICE — CONNECTOR,TUBING,5-IN-1,NON-STERILE: Brand: MEDLINE INDUSTRIES, INC.

## (undated) DEVICE — SUTURE VCRL SZ 3-0 L27IN ABSRB UD L26MM SH 1/2 CIR J416H

## (undated) DEVICE — GOWN,SIRUS,NONRNF,SETINSLV,XL,20/CS: Brand: MEDLINE

## (undated) DEVICE — GOWN,AURORA,NONRNF,XL,30/CS: Brand: MEDLINE

## (undated) DEVICE — SOLUTION ANTIFOG VIS SYS CLEARIFY LAPSCP

## (undated) DEVICE — GOWN,AURORA,NONREINFORCED,LARGE: Brand: MEDLINE

## (undated) DEVICE — SUTURE PROL SZ 2-0 L30IN NONABSORBABLE BLU L26MM SH 1/2 CIR 8833H

## (undated) DEVICE — BLADELESS OBTURATOR: Brand: WECK VISTA